# Patient Record
Sex: FEMALE | Race: WHITE | Employment: FULL TIME | URBAN - METROPOLITAN AREA
[De-identification: names, ages, dates, MRNs, and addresses within clinical notes are randomized per-mention and may not be internally consistent; named-entity substitution may affect disease eponyms.]

---

## 2017-02-11 PROBLEM — I26.99 PULMONARY EMBOLISM AND INFARCTION, IATROGENIC (HCC): Status: ACTIVE | Noted: 2017-02-11

## 2017-02-11 PROBLEM — T81.718A PULMONARY EMBOLISM AND INFARCTION, IATROGENIC (HCC): Status: ACTIVE | Noted: 2017-02-11

## 2018-02-28 ENCOUNTER — TELEPHONE (OUTPATIENT)
Dept: ENT CLINIC | Age: 33
End: 2018-02-28

## 2018-04-06 LAB
CHOLESTEROL, TOTAL: 263 MG/DL (ref 0–199)
FOLLICLE STIMULATING HORMONE: 6.2 MIU/ML
HDLC SERPL-MCNC: 35 MG/DL (ref 40–60)
LDL CHOLESTEROL CALCULATED: 186 MG/DL
TRIGL SERPL-MCNC: 212 MG/DL (ref 0–150)
TSH SERPL DL<=0.05 MIU/L-ACNC: 3.33 UIU/ML (ref 0.27–4.2)
VLDLC SERPL CALC-MCNC: 42 MG/DL

## 2018-04-07 LAB
ESTIMATED AVERAGE GLUCOSE: 128.4 MG/DL
HBA1C MFR BLD: 6.1 %

## 2018-07-08 NOTE — TELEPHONE ENCOUNTER
Apparently, this patient has no desire to be seen at this office. Pili call and inform her PCP that patient did not pursue the referral made to me for ENT E&M.

## 2018-10-25 ENCOUNTER — HOSPITAL ENCOUNTER (EMERGENCY)
Age: 33
Discharge: HOME | End: 2018-10-25
Payer: MEDICAID

## 2018-10-25 ENCOUNTER — APPOINTMENT (OUTPATIENT)
Dept: CT IMAGING | Age: 33
End: 2018-10-25
Payer: MEDICAID

## 2018-10-25 VITALS
WEIGHT: 200 LBS | TEMPERATURE: 98.3 F | HEART RATE: 95 BPM | SYSTOLIC BLOOD PRESSURE: 135 MMHG | RESPIRATION RATE: 16 BRPM | HEIGHT: 68 IN | OXYGEN SATURATION: 98 % | DIASTOLIC BLOOD PRESSURE: 98 MMHG | BODY MASS INDEX: 30.31 KG/M2

## 2018-10-25 DIAGNOSIS — M23.8X1 DERANGEMENT OF RIGHT KNEE LIGAMENT: Primary | ICD-10-CM

## 2018-10-25 PROCEDURE — 73700 CT LOWER EXTREMITY W/O DYE: CPT

## 2018-10-25 PROCEDURE — 99283 EMERGENCY DEPT VISIT LOW MDM: CPT

## 2018-10-25 PROCEDURE — 6370000000 HC RX 637 (ALT 250 FOR IP): Performed by: NURSE PRACTITIONER

## 2018-10-25 RX ORDER — NAPROXEN 250 MG/1
500 TABLET ORAL ONCE
Status: COMPLETED | OUTPATIENT
Start: 2018-10-25 | End: 2018-10-25

## 2018-10-25 RX ADMIN — NAPROXEN 500 MG: 250 TABLET ORAL at 15:28

## 2018-10-25 ASSESSMENT — ENCOUNTER SYMPTOMS
RHINORRHEA: 0
CONSTIPATION: 0
SHORTNESS OF BREATH: 0
ABDOMINAL PAIN: 0
BLOOD IN STOOL: 0
SORE THROAT: 0
NAUSEA: 0
VOMITING: 0
DIARRHEA: 0

## 2018-10-25 ASSESSMENT — PAIN SCALES - GENERAL
PAINLEVEL_OUTOF10: 10
PAINLEVEL_OUTOF10: 10

## 2018-10-25 ASSESSMENT — PAIN DESCRIPTION - ORIENTATION: ORIENTATION: RIGHT

## 2018-10-25 ASSESSMENT — PAIN DESCRIPTION - PAIN TYPE: TYPE: ACUTE PAIN

## 2018-10-25 ASSESSMENT — PAIN DESCRIPTION - LOCATION: LOCATION: KNEE

## 2018-10-25 NOTE — ED PROVIDER NOTES
note for interpretation of EKG. RADIOLOGY:   Interpretation per the Radiologist below, if available at the time of this note:    CT KNEE RIGHT WO CONTRAST   Preliminary Result   1. No acute fracture or gross dislocation identified by CT. 2. Mild edema in the subcutaneous fat along the anterior knee. 3. If pain persists, consider further evaluation with MRI. No results found. PROCEDURES   Unless otherwise noted below, none     Procedures    Right knee immoblizer splint was placed by the emergency department technician, it was applied appropriately and the patient was neurovascularly intact as observed by myself. This is definitive management. I have given crutches to the patient, adjusted them and provided complete instructions on safe use. CRITICAL CARE TIME     n/a    CONSULTS:  None      EMERGENCY DEPARTMENT COURSE and DIFFERENTIAL DIAGNOSIS/MDM:   Vitals:    Vitals:    10/25/18 1453   BP: (!) 135/98   Pulse: 101   Resp: 16   Temp: 98.3 °F (36.8 °C)   TempSrc: Oral   SpO2: 98%   Weight: 200 lb (90.7 kg)   Height: 5' 8\" (1.727 m)       Trisha Castellanos was given the following medications:  Medications   naproxen (NAPROSYN) tablet 500 mg (500 mg Oral Given 10/25/18 1528)       Trisha Castellanos was evaluated in the emergency department with concern for Right knee injury. CT imaging shows no evidence of acute fracture or dislocation. There is edema. She does have pain on the lateral aspect of the knee which is concerning for ligamental injury. My suspicion is low for fracture, dislocation, cellulitis, septic joint, joint effusion, arterial occlusion, vascular compromise, gout, or pseudogout. Differential include sprain, strain, occult fracture, or contusion. The patient was instructed on RICE therapy. Please continue knee immobilizer splint. Given crutches and instructed on their use. Orthopedic follow-up recommended.     Trisha Castellanos is stable in the ER and safe

## 2018-12-29 ENCOUNTER — HOSPITAL ENCOUNTER (OUTPATIENT)
Age: 33
Discharge: HOME | End: 2018-12-29
Payer: MEDICAID

## 2018-12-29 LAB — PROGESTERONE LEVEL: 3.87 NG/ML

## 2018-12-29 PROCEDURE — 36415 COLL VENOUS BLD VENIPUNCTURE: CPT

## 2018-12-29 PROCEDURE — 84144 ASSAY OF PROGESTERONE: CPT

## 2019-01-15 LAB
HPV COMMENT: NORMAL
HPV TYPE 16: NOT DETECTED
HPV TYPE 18: NOT DETECTED
HPVOH (OTHER TYPES): NOT DETECTED

## 2019-02-26 ENCOUNTER — HOSPITAL ENCOUNTER (OUTPATIENT)
Age: 34
Discharge: HOME | End: 2019-02-26
Payer: MEDICAID

## 2019-02-26 LAB — PROGESTERONE LEVEL: 6.07 NG/ML

## 2019-02-26 PROCEDURE — 36415 COLL VENOUS BLD VENIPUNCTURE: CPT

## 2019-02-26 PROCEDURE — 84144 ASSAY OF PROGESTERONE: CPT

## 2019-05-01 ENCOUNTER — HOSPITAL ENCOUNTER (OUTPATIENT)
Age: 34
Discharge: HOME | End: 2019-05-01
Payer: MEDICAID

## 2019-05-01 LAB — PROGESTERONE LEVEL: 0.13 NG/ML

## 2019-05-01 PROCEDURE — 84144 ASSAY OF PROGESTERONE: CPT

## 2019-05-01 PROCEDURE — 36415 COLL VENOUS BLD VENIPUNCTURE: CPT

## 2019-05-06 LAB — PROGESTERONE LEVEL: 0.79 NG/ML

## 2019-06-05 LAB
ABO/RH: NORMAL
ANTIBODY SCREEN: NORMAL
ANTIBODY SCREEN: NORMAL
HEPATITIS C ANTIBODY INTERPRETATION: NORMAL

## 2019-06-06 LAB
BASOPHILS ABSOLUTE: 0.1 K/UL (ref 0–0.2)
BASOPHILS RELATIVE PERCENT: 0.7 %
EOSINOPHILS ABSOLUTE: 0.1 K/UL (ref 0–0.6)
EOSINOPHILS RELATIVE PERCENT: 1.8 %
HCT VFR BLD CALC: 42.8 % (ref 36–48)
HEMOGLOBIN: 14 G/DL (ref 12–16)
HEPATITIS B SURFACE ANTIGEN INTERPRETATION: ABNORMAL
HIV AG/AB: NORMAL
HIV ANTIGEN: NORMAL
HIV-1 ANTIBODY: NORMAL
HIV-2 AB: NORMAL
LYMPHOCYTES ABSOLUTE: 2.3 K/UL (ref 1–5.1)
LYMPHOCYTES RELATIVE PERCENT: 28.5 %
MCH RBC QN AUTO: 31.2 PG (ref 26–34)
MCHC RBC AUTO-ENTMCNC: 32.8 G/DL (ref 31–36)
MCV RBC AUTO: 95 FL (ref 80–100)
MONOCYTES ABSOLUTE: 0.5 K/UL (ref 0–1.3)
MONOCYTES RELATIVE PERCENT: 6.4 %
NEUTROPHILS ABSOLUTE: 5.1 K/UL (ref 1.7–7.7)
NEUTROPHILS RELATIVE PERCENT: 62.6 %
PDW BLD-RTO: 15.7 % (ref 12.4–15.4)
PLATELET # BLD: 228 K/UL (ref 135–450)
PMV BLD AUTO: 9.5 FL (ref 5–10.5)
RBC # BLD: 4.5 M/UL (ref 4–5.2)
RUBELLA ANTIBODY IGG: >500 IU/ML
TOTAL SYPHILLIS IGG/IGM: ABNORMAL
URINE CULTURE, ROUTINE: NORMAL
WBC # BLD: 8.2 K/UL (ref 4–11)

## 2019-07-02 LAB — GLUCOSE CHALLENGE: 146 MG/DL

## 2019-08-02 ENCOUNTER — HOSPITAL ENCOUNTER (OUTPATIENT)
Dept: OTHER | Age: 34
Discharge: HOME | End: 2019-08-02
Payer: MEDICAID

## 2019-08-02 LAB
GLUCOSE FASTING: 111 MG/DL
GLUCOSE TOLERANCE TEST 1 HOUR: 197 MG/DL
GLUCOSE TOLERANCE TEST 2 HOUR: 126 MG/DL
GLUCOSE TOLERANCE TEST 3 HOUR: 91 MG/DL

## 2019-08-02 PROCEDURE — 82952 GTT-ADDED SAMPLES: CPT

## 2019-08-02 PROCEDURE — 36415 COLL VENOUS BLD VENIPUNCTURE: CPT

## 2019-08-02 PROCEDURE — 82951 GLUCOSE TOLERANCE TEST (GTT): CPT

## 2019-08-13 LAB
ESTIMATED AVERAGE GLUCOSE: 102.5 MG/DL
HBA1C MFR BLD: 5.2 %

## 2019-08-22 ENCOUNTER — HOSPITAL ENCOUNTER (OUTPATIENT)
Dept: DIABETES SERVICES | Age: 34
Setting detail: THERAPIES SERIES
Discharge: HOME | End: 2019-08-22
Payer: MEDICAID

## 2019-08-22 VITALS — BODY MASS INDEX: 33.6 KG/M2 | WEIGHT: 221 LBS

## 2019-08-22 DIAGNOSIS — O24.419 GESTATIONAL DIABETES MELLITUS (GDM), ANTEPARTUM, GESTATIONAL DIABETES METHOD OF CONTROL UNSPECIFIED: Primary | ICD-10-CM

## 2019-08-22 PROCEDURE — G0109 DIAB MANAGE TRN IND/GROUP: HCPCS | Performed by: DIETITIAN, REGISTERED

## 2019-08-22 ASSESSMENT — PATIENT HEALTH QUESTIONNAIRE - PHQ9
1. LITTLE INTEREST OR PLEASURE IN DOING THINGS: 0
2. FEELING DOWN, DEPRESSED OR HOPELESS: 1
SUM OF ALL RESPONSES TO PHQ QUESTIONS 1-9: 1
SUM OF ALL RESPONSES TO PHQ QUESTIONS 1-9: 1
SUM OF ALL RESPONSES TO PHQ9 QUESTIONS 1 & 2: 1

## 2019-08-22 NOTE — LETTER
Diabetes Education  [facility])    TO: Dr. Shanda Gracia    RE: Urmila Ziegler  : 1985    Your patient was seen for Gestational Diabetes Education on 2019. Patient is prescribed insulin twice daily. EDUCATION INCLUDED:   [x]  Definition of Gestational Diabetes                                                                                 [x]  Risk Factors/Acute Complications     [x]  Blood Glucose Monitoring        [x]  Exercise/Activity    [x]  Carbohydrate Counting  [x]  Timing of Meals    [x]  Food Logs  [x]  Eating Out  [x]  Nutrition Precautions for Pregnancy  [x]  Use of Blood glucose meter reviewed:pt already testing. Recent FB-125 mg/dl      Recent post meal B-190 mg/dl  [x]  PHQ2 Depression Screen; patient scored 1. Recommend further evaluation if       score >3    PATIENT GOALS:  [x]  Follow meal plan with appropriate Carbohydrate intake:   Breakfast: 15 - 30 g Carb    Lunch: 30 - 45 g Carb   Dinner:  45 - 60 g Carb    3 snacks: 15 - 30 g Carb each  [x] Monitor blood glucose four times daily. FBG Goal: 60 - 95 or as recommended by physician   1hr PP <140 or as recommended by physician   2 hr PP <120 or as recommended by physician  [x] Exercise    [x] Log glucose results and bring to physicians office at each scheduled appointment  [x] Other:     PLAN:  [x] Return for follow-up visit to review Food/Log      Thank you for the opportunity to provide Diabetes Self Management Education to your patient.     Biipn Barr  Diabetes Educator

## 2019-08-22 NOTE — PROGRESS NOTES
Gestational Diabetes Patient Assessment and Education    Name: Mauri Hernandez : 1985  EDC:20      Education Completed  [x]  Definition of Gestational Diabetes                                                                                 [x]  Risk Factors/Acute Complications     [x]  Blood Glucose Monitoring--schedule, target levels     [x]  Exercise/Activity    [x]  Carbohydrate Counting  [x]  Timing of Meals    [x]  Food Logs  [x]  Eating Out  [x]  Nutrition Precautions for Pregnancy  [x]  Use of Blood glucose meter reviewed- already testing BG            Patient Goals  [x]  Follow meal plan with appropriate Carbohydrate intake:   Breakfast: 30 g Carb    Lunch: 30 - 45 g Carb   Dinner:  45 - 60 g Carb    3 snacks: 15 - 30 g Carb each  [x] Monitor blood glucose four times daily.     FBG Goal: 60 - 95 or as recommended by physician   1hr PP <140 or as recommended by physician   2 hr PP <120 or as recommended by physician  [x] Exercise    [x] Log glucose results and bring to physicians office at each scheduled appointment  [] Other:     Plan  [x] Return for follow-up visit to review Food/Log      Referring Provider: Lata    Total participants in Group:3

## 2019-08-28 ENCOUNTER — HOSPITAL ENCOUNTER (OUTPATIENT)
Dept: DIABETES SERVICES | Age: 34
Setting detail: THERAPIES SERIES
Discharge: HOME | End: 2019-08-28
Payer: MEDICAID

## 2019-08-28 DIAGNOSIS — O24.419 GESTATIONAL DIABETES MELLITUS (GDM), ANTEPARTUM, GESTATIONAL DIABETES METHOD OF CONTROL UNSPECIFIED: Primary | ICD-10-CM

## 2019-08-28 PROCEDURE — G0108 DIAB MANAGE TRN  PER INDIV: HCPCS | Performed by: DIETITIAN, REGISTERED

## 2019-08-28 NOTE — LETTER
Diabetes Education  [facility])    TO: Dr. Nel Monroe    RE: Dennie Cash  : 1985    Your patient attended a Follow-up session for Gestational Diabetes on 2019. Education included the following:   [x] Review of BG Log   Fasting BG Range: mg/dl- pt will start on Metformin this evening. 1 hr PP BG Range:  mg/dl      In past week since diabetes class post prandial <140 mg/dl                                     [x] Review of Food Log   [x] Additional Resources- insulin site rotation, take Metformin with food. [x]  Diet guidelines for sick days  [x] Post-Partum Guidelines  [x] Other:Insulin regimen: Humulin-N 10 units at breakfast and 20 units at bedtime. Post Program Recommendations include:  [x] Diabetes Screening 6 - 12 weeks post partum  [] Pre-Diabetes Group Class   [] Other : Thank you for the opportunity to provide Diabetes Education to your patient.     [address] Diabetes Educator

## 2019-09-07 ENCOUNTER — HOSPITAL ENCOUNTER (EMERGENCY)
Age: 34
Discharge: HOME | End: 2019-09-08
Attending: EMERGENCY MEDICINE
Payer: MEDICAID

## 2019-09-07 VITALS
HEART RATE: 97 BPM | BODY MASS INDEX: 33.49 KG/M2 | WEIGHT: 221 LBS | OXYGEN SATURATION: 100 % | HEIGHT: 68 IN | DIASTOLIC BLOOD PRESSURE: 85 MMHG | SYSTOLIC BLOOD PRESSURE: 135 MMHG | RESPIRATION RATE: 16 BRPM | TEMPERATURE: 99.2 F

## 2019-09-07 DIAGNOSIS — Z3A.19 19 WEEKS GESTATION OF PREGNANCY: ICD-10-CM

## 2019-09-07 DIAGNOSIS — R10.9 RIGHT FLANK PAIN: Primary | ICD-10-CM

## 2019-09-07 PROCEDURE — 81003 URINALYSIS AUTO W/O SCOPE: CPT

## 2019-09-07 PROCEDURE — 99284 EMERGENCY DEPT VISIT MOD MDM: CPT

## 2019-09-07 ASSESSMENT — PAIN SCALES - GENERAL: PAINLEVEL_OUTOF10: 9

## 2019-09-08 LAB
BILIRUBIN URINE: NEGATIVE
BLOOD, URINE: NEGATIVE
CLARITY: CLEAR
COLOR: YELLOW
GLUCOSE URINE: NEGATIVE MG/DL
KETONES, URINE: NEGATIVE MG/DL
LEUKOCYTE ESTERASE, URINE: NEGATIVE
MICROSCOPIC EXAMINATION: NORMAL
NITRITE, URINE: NEGATIVE
PH UA: 6 (ref 5–8)
PROTEIN UA: NEGATIVE MG/DL
SPECIFIC GRAVITY UA: 1.01 (ref 1–1.03)
URINE REFLEX TO CULTURE: NORMAL
URINE TYPE: NORMAL
UROBILINOGEN, URINE: 0.2 E.U./DL

## 2019-09-08 ASSESSMENT — ENCOUNTER SYMPTOMS
SHORTNESS OF BREATH: 0
NAUSEA: 0
ABDOMINAL PAIN: 1
VOMITING: 0

## 2019-09-08 NOTE — CONSULTS
Department of Obstetrics and Gynecology  Consult        CHIEF COMPLAINT:  abdominal pain    HISTORY OF PRESENT ILLNESS:      The patient is a 36 y.o. female at 23w7d  OB History        2    Para   1    Term   1       0    AB   0    Living   1       SAB   0    TAB   0    Ectopic   0    Molar        Multiple   0    Live Births   1            Pt developed RLQ pain and midabdominal pain several days ago- intermittent. has some nausea, no emesis. Pt had constipation for 2 weeks but it resolved after she start taking Metformin for gestational diabetes.     PRENATAL CARE:    Complicated by: gestational diabetes class A 2, Hx of PE- currently on Lovenox    PAST OB HISTORY:  OB History        2    Para   1    Term   1       0    AB   0    Living   1       SAB   0    TAB   0    Ectopic   0    Molar        Multiple   0    Live Births   1                Past Medical History:        Diagnosis Date    Heart murmur     High blood pressure     during pregnancy    Infertility, female 1    conception on Clomid     Migraine     Pulmonary embolism on right [facility])      Past Surgical History:        Procedure Laterality Date     SECTION  2015    CHOLECYSTECTOMY  2016    KNEE SURGERY Right     to treat bone spur    KNEE SURGERY  2011    TONSILLECTOMY      WISDOM TOOTH EXTRACTION       Allergies:  Ciprofloxacin; Azithromycin; Cipro xr; and Sulfa antibiotics    Social History:    Social History     Socioeconomic History    Marital status: Single     Spouse name: Not on file    Number of children: Not on file    Years of education: Not on file    Highest education level: Not on file   Occupational History    Not on file   Social Needs    Financial resource strain: Not on file    Food insecurity:     Worry: Not on file     Inability: Not on file    Transportation needs:     Medical: Not on file     Non-medical: Not on file   Tobacco Use    Smoking status: Former Smoker

## 2019-09-08 NOTE — ED PROVIDER NOTES
tablet, R-0Print               ALLERGIES     Ciprofloxacin; Azithromycin; Cipro xr; and Sulfa antibiotics    FAMILYHISTORY       Family History   Problem Relation Age of Onset    High Blood Pressure Mother     High Cholesterol Mother     High Blood Pressure Father     Asthma Father     High Cholesterol Father    [employer] Early Death Sister         accidental overdose     Diabetes Maternal Grandmother     Arthritis Maternal Grandmother     Stroke Maternal Grandmother     Birth Defects Neg Hx     Cancer Neg Hx     Depression Neg Hx     Kidney Disease Neg Hx     Heart Disease Neg Hx     Hearing Loss Neg Hx     Learning Disabilities Neg Hx     Mental Illness Neg Hx     Mental Retardation Neg Hx     Miscarriages / Stillbirths Neg Hx     Substance Abuse Neg Hx     Vision Loss Neg Hx           SOCIAL HISTORY       Social History     Socioeconomic History    Marital status: Single     Spouse name: None    Number of children: None    Years of education: None    Highest education level: None   Occupational History    None   Social Needs    Financial resource strain: None    Food insecurity:     Worry: None     Inability: None    Transportation needs:     Medical: None     Non-medical: None   Tobacco Use    Smoking status: Former Smoker     Packs/day: 0.50     Types: Cigarettes     Last attempt to quit: 3/24/2015     Years since quittin.4    Smokeless tobacco: Never Used   Substance and Sexual Activity    Alcohol use: Yes     Comment: occ    Drug use: No    Sexual activity: Yes     Partners: Male   Lifestyle    Physical activity:     Days per week: None     Minutes per session: None    Stress: None   Relationships    Social connections:     Talks on phone: None     Gets together: None     Attends Rastafarian service: None     Active member of club or organization: None     Attends meetings of clubs or organizations: None     Relationship status: None    Intimate partner violence:     Fear of

## 2019-10-10 LAB
BASOPHILS ABSOLUTE: 0 K/UL (ref 0–0.2)
BASOPHILS RELATIVE PERCENT: 0.3 %
EOSINOPHILS ABSOLUTE: 0.2 K/UL (ref 0–0.6)
EOSINOPHILS RELATIVE PERCENT: 1.7 %
HCT VFR BLD CALC: 35.7 % (ref 36–48)
HEMOGLOBIN: 12.4 G/DL (ref 12–16)
LYMPHOCYTES ABSOLUTE: 2.1 K/UL (ref 1–5.1)
LYMPHOCYTES RELATIVE PERCENT: 22.8 %
MCH RBC QN AUTO: 30.8 PG (ref 26–34)
MCHC RBC AUTO-ENTMCNC: 34.6 G/DL (ref 31–36)
MCV RBC AUTO: 88.9 FL (ref 80–100)
MONOCYTES ABSOLUTE: 0.5 K/UL (ref 0–1.3)
MONOCYTES RELATIVE PERCENT: 5.4 %
NEUTROPHILS ABSOLUTE: 6.3 K/UL (ref 1.7–7.7)
NEUTROPHILS RELATIVE PERCENT: 69.8 %
PDW BLD-RTO: 14.6 % (ref 12.4–15.4)
PLATELET # BLD: 253 K/UL (ref 135–450)
PMV BLD AUTO: 9.6 FL (ref 5–10.5)
RBC # BLD: 4.02 M/UL (ref 4–5.2)
WBC # BLD: 9 K/UL (ref 4–11)

## 2019-11-06 ENCOUNTER — HOSPITAL ENCOUNTER (OUTPATIENT)
Age: 34
Discharge: HOME | End: 2019-11-06
Attending: OBSTETRICS & GYNECOLOGY | Admitting: OBSTETRICS & GYNECOLOGY
Payer: MEDICAID

## 2019-11-06 VITALS
TEMPERATURE: 96.4 F | DIASTOLIC BLOOD PRESSURE: 85 MMHG | RESPIRATION RATE: 20 BRPM | HEART RATE: 115 BPM | SYSTOLIC BLOOD PRESSURE: 140 MMHG

## 2019-11-06 LAB — RHIG LOT NUMBER: NORMAL

## 2019-11-06 PROCEDURE — 96372 THER/PROPH/DIAG INJ SC/IM: CPT

## 2019-11-06 PROCEDURE — 6360000002 HC RX W HCPCS: Performed by: OBSTETRICS & GYNECOLOGY

## 2019-11-06 PROCEDURE — 90715 TDAP VACCINE 7 YRS/> IM: CPT | Performed by: OBSTETRICS & GYNECOLOGY

## 2019-11-06 PROCEDURE — 90471 IMMUNIZATION ADMIN: CPT | Performed by: OBSTETRICS & GYNECOLOGY

## 2019-11-06 RX ADMIN — TETANUS TOXOID, REDUCED DIPHTHERIA TOXOID AND ACELLULAR PERTUSSIS VACCINE, ADSORBED 0.5 ML: 5; 2.5; 8; 8; 2.5 SUSPENSION INTRAMUSCULAR at 15:28

## 2019-11-06 RX ADMIN — HUMAN RHO(D) IMMUNE GLOBULIN 300 MCG: 300 INJECTION, SOLUTION INTRAMUSCULAR at 15:27

## 2019-11-29 ENCOUNTER — HOSPITAL ENCOUNTER (OUTPATIENT)
Age: 34
Discharge: HOME | End: 2019-11-29
Attending: OBSTETRICS & GYNECOLOGY | Admitting: OBSTETRICS & GYNECOLOGY
Payer: MEDICAID

## 2019-11-29 VITALS
BODY MASS INDEX: 36.07 KG/M2 | WEIGHT: 238 LBS | HEIGHT: 68 IN | SYSTOLIC BLOOD PRESSURE: 139 MMHG | RESPIRATION RATE: 18 BRPM | HEART RATE: 87 BPM | TEMPERATURE: 97 F | DIASTOLIC BLOOD PRESSURE: 87 MMHG

## 2019-11-29 PROBLEM — R42 DIZZINESS: Status: ACTIVE | Noted: 2019-11-29

## 2019-11-29 LAB
BILIRUBIN URINE: NEGATIVE
BLOOD, URINE: NEGATIVE
CLARITY: CLEAR
COLOR: YELLOW
GLUCOSE BLD-MCNC: 103 MG/DL (ref 70–99)
GLUCOSE URINE: NEGATIVE MG/DL
HCT VFR BLD CALC: 34.2 % (ref 36–48)
HEMOGLOBIN: 11.8 G/DL (ref 12–16)
KETONES, URINE: NEGATIVE MG/DL
LEUKOCYTE ESTERASE, URINE: NEGATIVE
MCH RBC QN AUTO: 29.7 PG (ref 26–34)
MCHC RBC AUTO-ENTMCNC: 34.4 G/DL (ref 31–36)
MCV RBC AUTO: 86.3 FL (ref 80–100)
MICROSCOPIC EXAMINATION: NORMAL
NITRITE, URINE: NEGATIVE
PDW BLD-RTO: 14.7 % (ref 12.4–15.4)
PERFORMED ON: ABNORMAL
PH UA: 6 (ref 5–8)
PLATELET # BLD: 278 K/UL (ref 135–450)
PMV BLD AUTO: 8.6 FL (ref 5–10.5)
PROTEIN UA: NEGATIVE MG/DL
RBC # BLD: 3.96 M/UL (ref 4–5.2)
SPECIFIC GRAVITY UA: 1.01 (ref 1–1.03)
URINE TYPE: NORMAL
UROBILINOGEN, URINE: 1 E.U./DL
WBC # BLD: 7.7 K/UL (ref 4–11)

## 2019-11-29 PROCEDURE — 99211 OFF/OP EST MAY X REQ PHY/QHP: CPT

## 2019-11-29 PROCEDURE — 59025 FETAL NON-STRESS TEST: CPT

## 2019-11-29 PROCEDURE — 81003 URINALYSIS AUTO W/O SCOPE: CPT

## 2019-11-29 PROCEDURE — 85027 COMPLETE CBC AUTOMATED: CPT

## 2019-12-01 LAB — GBS, EXTERNAL RESULT: NEGATIVE

## 2019-12-19 ENCOUNTER — HOSPITAL ENCOUNTER (OUTPATIENT)
Age: 34
Discharge: HOME | End: 2019-12-19
Attending: OBSTETRICS & GYNECOLOGY | Admitting: OBSTETRICS & GYNECOLOGY
Payer: MEDICAID

## 2019-12-19 VITALS
TEMPERATURE: 98.4 F | HEART RATE: 85 BPM | SYSTOLIC BLOOD PRESSURE: 109 MMHG | DIASTOLIC BLOOD PRESSURE: 70 MMHG | RESPIRATION RATE: 18 BRPM

## 2019-12-19 PROBLEM — R51.9 HEADACHE IN PREGNANCY, ANTEPARTUM: Status: ACTIVE | Noted: 2019-12-19

## 2019-12-19 PROBLEM — O26.899 HEADACHE IN PREGNANCY, ANTEPARTUM: Status: ACTIVE | Noted: 2019-12-19

## 2019-12-19 LAB
A/G RATIO: 0.8 (ref 1.1–2.2)
ALBUMIN SERPL-MCNC: 3.1 G/DL (ref 3.4–5)
ALP BLD-CCNC: 140 U/L (ref 40–129)
ALT SERPL-CCNC: 11 U/L (ref 10–40)
ANION GAP SERPL CALCULATED.3IONS-SCNC: 13 MMOL/L (ref 3–16)
AST SERPL-CCNC: 9 U/L (ref 15–37)
BASOPHILS ABSOLUTE: 0.1 K/UL (ref 0–0.2)
BASOPHILS RELATIVE PERCENT: 1 %
BILIRUB SERPL-MCNC: <0.2 MG/DL (ref 0–1)
BILIRUBIN URINE: NEGATIVE
BLOOD, URINE: NEGATIVE
BUN BLDV-MCNC: 9 MG/DL (ref 7–20)
CALCIUM SERPL-MCNC: 8.9 MG/DL (ref 8.3–10.6)
CHLORIDE BLD-SCNC: 105 MMOL/L (ref 99–110)
CLARITY: ABNORMAL
CO2: 19 MMOL/L (ref 21–32)
COLOR: YELLOW
CREAT SERPL-MCNC: 0.5 MG/DL (ref 0.6–1.1)
EOSINOPHILS ABSOLUTE: 0.1 K/UL (ref 0–0.6)
EOSINOPHILS RELATIVE PERCENT: 1 %
EPITHELIAL CELLS, UA: 18 /HPF (ref 0–5)
ESTIMATED AVERAGE GLUCOSE: 99.7 MG/DL
GFR AFRICAN AMERICAN: >60
GFR NON-AFRICAN AMERICAN: >60
GLOBULIN: 4 G/DL
GLUCOSE BLD-MCNC: 96 MG/DL (ref 70–99)
GLUCOSE URINE: NEGATIVE MG/DL
HBA1C MFR BLD: 5.1 %
HCT VFR BLD CALC: 36 % (ref 36–48)
HEMOGLOBIN: 12.6 G/DL (ref 12–16)
HYALINE CASTS: 8 /LPF (ref 0–8)
KETONES, URINE: NEGATIVE MG/DL
LEUKOCYTE ESTERASE, URINE: ABNORMAL
LYMPHOCYTES ABSOLUTE: 2 K/UL (ref 1–5.1)
LYMPHOCYTES RELATIVE PERCENT: 23.9 %
MCH RBC QN AUTO: 29.8 PG (ref 26–34)
MCHC RBC AUTO-ENTMCNC: 34.9 G/DL (ref 31–36)
MCV RBC AUTO: 85.3 FL (ref 80–100)
MICROSCOPIC EXAMINATION: YES
MONOCYTES ABSOLUTE: 0.4 K/UL (ref 0–1.3)
MONOCYTES RELATIVE PERCENT: 4.9 %
NEUTROPHILS ABSOLUTE: 5.8 K/UL (ref 1.7–7.7)
NEUTROPHILS RELATIVE PERCENT: 69.2 %
NITRITE, URINE: NEGATIVE
PDW BLD-RTO: 14.8 % (ref 12.4–15.4)
PH UA: 6.5 (ref 5–8)
PLATELET # BLD: 250 K/UL (ref 135–450)
PMV BLD AUTO: 9 FL (ref 5–10.5)
POTASSIUM SERPL-SCNC: 4 MMOL/L (ref 3.5–5.1)
PROTEIN UA: NEGATIVE MG/DL
RBC # BLD: 4.22 M/UL (ref 4–5.2)
RBC UA: 3 /HPF (ref 0–4)
SODIUM BLD-SCNC: 137 MMOL/L (ref 136–145)
SPECIFIC GRAVITY UA: 1.02 (ref 1–1.03)
TOTAL PROTEIN: 7.1 G/DL (ref 6.4–8.2)
URIC ACID, SERUM: 5.4 MG/DL (ref 2.6–6)
URINE TYPE: ABNORMAL
UROBILINOGEN, URINE: 0.2 E.U./DL
WBC # BLD: 8.3 K/UL (ref 4–11)
WBC UA: 8 /HPF (ref 0–5)

## 2019-12-19 PROCEDURE — 81001 URINALYSIS AUTO W/SCOPE: CPT

## 2019-12-19 PROCEDURE — 59025 FETAL NON-STRESS TEST: CPT

## 2019-12-19 PROCEDURE — 84550 ASSAY OF BLOOD/URIC ACID: CPT

## 2019-12-19 PROCEDURE — 85025 COMPLETE CBC W/AUTO DIFF WBC: CPT

## 2019-12-19 PROCEDURE — 80053 COMPREHEN METABOLIC PANEL: CPT

## 2019-12-19 PROCEDURE — 99211 OFF/OP EST MAY X REQ PHY/QHP: CPT

## 2019-12-19 RX ORDER — SYRING-NEEDL,DISP,INSUL,0.3 ML 31GX15/64"
1 SYRINGE, EMPTY DISPOSABLE MISCELLANEOUS 4 TIMES DAILY
Refills: 11 | Status: ON HOLD | COMMUNITY
Start: 2019-11-09 | End: 2020-01-09

## 2019-12-19 RX ORDER — INSULIN HUMAN 100 [IU]/ML
18 INJECTION, SOLUTION PARENTERAL
Status: ON HOLD | COMMUNITY
Start: 2019-12-18 | End: 2020-01-09

## 2019-12-19 RX ORDER — ALBUTEROL SULFATE 90 UG/1
2 AEROSOL, METERED RESPIRATORY (INHALATION) 4 TIMES DAILY
COMMUNITY
Start: 2019-12-14

## 2019-12-19 RX ORDER — ACARBOSE 25 MG/1
25 TABLET ORAL DAILY
Status: ON HOLD | COMMUNITY
Start: 2019-12-18 | End: 2020-01-09

## 2019-12-19 RX ORDER — FLUTICASONE PROPIONATE 50 MCG
1 SPRAY, SUSPENSION (ML) NASAL DAILY
Status: ON HOLD | COMMUNITY
Start: 2019-12-14 | End: 2020-01-09 | Stop reason: ALTCHOICE

## 2019-12-19 RX ORDER — AMOXICILLIN 875 MG/1
875 TABLET, COATED ORAL 2 TIMES DAILY
Status: ON HOLD | COMMUNITY
Start: 2019-12-14 | End: 2020-01-09 | Stop reason: ALTCHOICE

## 2019-12-19 ASSESSMENT — PAIN DESCRIPTION - DESCRIPTORS: DESCRIPTORS: THROBBING

## 2019-12-23 ENCOUNTER — HOSPITAL ENCOUNTER (OUTPATIENT)
Age: 34
Discharge: HOME | End: 2019-12-23
Attending: OBSTETRICS & GYNECOLOGY | Admitting: OBSTETRICS & GYNECOLOGY
Payer: MEDICAID

## 2019-12-23 VITALS
TEMPERATURE: 99 F | DIASTOLIC BLOOD PRESSURE: 84 MMHG | SYSTOLIC BLOOD PRESSURE: 123 MMHG | HEART RATE: 70 BPM | RESPIRATION RATE: 20 BRPM

## 2019-12-23 PROBLEM — Z36.89 NST (NON-STRESS TEST) REACTIVE: Status: ACTIVE | Noted: 2019-12-23

## 2019-12-23 PROCEDURE — 59025 FETAL NON-STRESS TEST: CPT

## 2019-12-28 ENCOUNTER — HOSPITAL ENCOUNTER (OUTPATIENT)
Age: 34
Discharge: HOME | End: 2019-12-28
Attending: OBSTETRICS & GYNECOLOGY | Admitting: OBSTETRICS & GYNECOLOGY
Payer: MEDICAID

## 2019-12-28 VITALS — HEART RATE: 81 BPM | RESPIRATION RATE: 18 BRPM | SYSTOLIC BLOOD PRESSURE: 118 MMHG | DIASTOLIC BLOOD PRESSURE: 77 MMHG

## 2019-12-28 PROBLEM — O24.419 GESTATIONAL DIABETES: Status: ACTIVE | Noted: 2019-12-28

## 2019-12-28 PROCEDURE — 59025 FETAL NON-STRESS TEST: CPT

## 2019-12-30 ENCOUNTER — HOSPITAL ENCOUNTER (OUTPATIENT)
Age: 34
Discharge: HOME | End: 2019-12-30
Attending: OBSTETRICS & GYNECOLOGY | Admitting: OBSTETRICS & GYNECOLOGY
Payer: MEDICAID

## 2019-12-30 VITALS
TEMPERATURE: 97 F | SYSTOLIC BLOOD PRESSURE: 147 MMHG | DIASTOLIC BLOOD PRESSURE: 94 MMHG | HEART RATE: 93 BPM | RESPIRATION RATE: 18 BRPM

## 2019-12-30 VITALS
WEIGHT: 249 LBS | TEMPERATURE: 97.5 F | RESPIRATION RATE: 18 BRPM | HEART RATE: 82 BPM | DIASTOLIC BLOOD PRESSURE: 62 MMHG | SYSTOLIC BLOOD PRESSURE: 116 MMHG | BODY MASS INDEX: 37.86 KG/M2

## 2019-12-30 LAB
A/G RATIO: 0.9 (ref 1.1–2.2)
ALBUMIN SERPL-MCNC: 3.1 G/DL (ref 3.4–5)
ALP BLD-CCNC: 135 U/L (ref 40–129)
ALT SERPL-CCNC: 7 U/L (ref 10–40)
ANION GAP SERPL CALCULATED.3IONS-SCNC: 12 MMOL/L (ref 3–16)
APTT: 27.8 SEC (ref 24.2–36.2)
AST SERPL-CCNC: 8 U/L (ref 15–37)
BASOPHILS ABSOLUTE: 0 K/UL (ref 0–0.2)
BASOPHILS RELATIVE PERCENT: 0.5 %
BILIRUB SERPL-MCNC: <0.2 MG/DL (ref 0–1)
BILIRUBIN URINE: NEGATIVE
BLOOD, URINE: NEGATIVE
BUN BLDV-MCNC: 8 MG/DL (ref 7–20)
CALCIUM SERPL-MCNC: 9.1 MG/DL (ref 8.3–10.6)
CHLORIDE BLD-SCNC: 101 MMOL/L (ref 99–110)
CLARITY: ABNORMAL
CO2: 21 MMOL/L (ref 21–32)
COLOR: YELLOW
CREAT SERPL-MCNC: 0.8 MG/DL (ref 0.6–1.1)
EOSINOPHILS ABSOLUTE: 0.1 K/UL (ref 0–0.6)
EOSINOPHILS RELATIVE PERCENT: 0.7 %
EPITHELIAL CELLS, UA: 14 /HPF (ref 0–5)
FIBRINOGEN: 533 MG/DL (ref 200–397)
GFR AFRICAN AMERICAN: >60
GFR NON-AFRICAN AMERICAN: >60
GLOBULIN: 3.6 G/DL
GLUCOSE BLD-MCNC: 81 MG/DL (ref 70–99)
GLUCOSE URINE: NEGATIVE MG/DL
HCT VFR BLD CALC: 36.8 % (ref 36–48)
HEMOGLOBIN: 12.7 G/DL (ref 12–16)
HYALINE CASTS: 4 /LPF (ref 0–8)
INR BLD: 0.81 (ref 0.86–1.14)
KETONES, URINE: NEGATIVE MG/DL
LEUKOCYTE ESTERASE, URINE: ABNORMAL
LYMPHOCYTES ABSOLUTE: 2.3 K/UL (ref 1–5.1)
LYMPHOCYTES RELATIVE PERCENT: 25.5 %
MCH RBC QN AUTO: 29.6 PG (ref 26–34)
MCHC RBC AUTO-ENTMCNC: 34.5 G/DL (ref 31–36)
MCV RBC AUTO: 85.9 FL (ref 80–100)
MICROSCOPIC EXAMINATION: YES
MONOCYTES ABSOLUTE: 0.5 K/UL (ref 0–1.3)
MONOCYTES RELATIVE PERCENT: 5.4 %
NEUTROPHILS ABSOLUTE: 6.1 K/UL (ref 1.7–7.7)
NEUTROPHILS RELATIVE PERCENT: 67.9 %
NITRITE, URINE: NEGATIVE
PDW BLD-RTO: 15.2 % (ref 12.4–15.4)
PH UA: 6 (ref 5–8)
PLATELET # BLD: 240 K/UL (ref 135–450)
PMV BLD AUTO: 9.9 FL (ref 5–10.5)
POTASSIUM SERPL-SCNC: 4.1 MMOL/L (ref 3.5–5.1)
PROTEIN UA: NEGATIVE MG/DL
PROTHROMBIN TIME: 9.4 SEC (ref 10–13.2)
RBC # BLD: 4.29 M/UL (ref 4–5.2)
RBC UA: 3 /HPF (ref 0–4)
SODIUM BLD-SCNC: 134 MMOL/L (ref 136–145)
SPECIFIC GRAVITY UA: 1.02 (ref 1–1.03)
TOTAL PROTEIN: 6.7 G/DL (ref 6.4–8.2)
URIC ACID, SERUM: 6 MG/DL (ref 2.6–6)
URINE TYPE: ABNORMAL
UROBILINOGEN, URINE: 0.2 E.U./DL
WBC # BLD: 9 K/UL (ref 4–11)
WBC UA: 3 /HPF (ref 0–5)

## 2019-12-30 PROCEDURE — 85730 THROMBOPLASTIN TIME PARTIAL: CPT

## 2019-12-30 PROCEDURE — 81001 URINALYSIS AUTO W/SCOPE: CPT

## 2019-12-30 PROCEDURE — 99211 OFF/OP EST MAY X REQ PHY/QHP: CPT

## 2019-12-30 PROCEDURE — 6370000000 HC RX 637 (ALT 250 FOR IP): Performed by: OBSTETRICS & GYNECOLOGY

## 2019-12-30 PROCEDURE — 80053 COMPREHEN METABOLIC PANEL: CPT

## 2019-12-30 PROCEDURE — 85025 COMPLETE CBC W/AUTO DIFF WBC: CPT

## 2019-12-30 PROCEDURE — 84550 ASSAY OF BLOOD/URIC ACID: CPT

## 2019-12-30 PROCEDURE — 59025 FETAL NON-STRESS TEST: CPT

## 2019-12-30 PROCEDURE — 85384 FIBRINOGEN ACTIVITY: CPT

## 2019-12-30 PROCEDURE — 85610 PROTHROMBIN TIME: CPT

## 2019-12-30 RX ORDER — OXYCODONE HYDROCHLORIDE 5 MG/1
5 TABLET ORAL ONCE
Status: DISCONTINUED | OUTPATIENT
Start: 2019-12-30 | End: 2019-12-30

## 2019-12-30 RX ORDER — ZOLPIDEM TARTRATE 5 MG/1
5 TABLET ORAL ONCE
Status: COMPLETED | OUTPATIENT
Start: 2019-12-30 | End: 2019-12-30

## 2019-12-30 RX ADMIN — ZOLPIDEM TARTRATE 5 MG: 5 TABLET ORAL at 19:17

## 2019-12-30 ASSESSMENT — PAIN DESCRIPTION - DESCRIPTORS
DESCRIPTORS: ACHING
DESCRIPTORS: ACHING

## 2019-12-30 NOTE — DISCHARGE SUMMARY
Department of Obstetrics and Gynecology  Labor and Delivery Triage Note        CHIEF COMPLAINT:  elevated blood pressure, headache    HISTORY OF PRESENT ILLNESS:      The patient is a 36 y.o. female 36w0d. OB History        2    Para   1    Term   1       0    AB   0    Living   1       SAB   0    TAB   0    Ectopic   0    Molar        Multiple   1    Live Births   1              Patient presents with a chief complaint as above. Estimated Due Date:  Estimated Date of Delivery: 20    PRENATAL CARE:    Complicated by: gestational hypertension and gestational diabetes class A 2    REVIEW OF SYSTEMS:    CONSTITUTIONAL:  negative  EYES:  negative  HEENT:  negative  RESPIRATORY:  negative  CARDIOVASCULAR:  negative  GASTROINTESTINAL:  negative  GENITOURINARY:  negative  INTEGUMENT/BREAST:  negative  MUSCULOSKELETAL:  negative  BEHAVIOR/PSYCH:  negative    PHYSICAL EXAM:    Vitals:    19 1230 19 1245 19 1259 19 1315   BP: 124/79 108/62 (!) 98/58 116/62   Pulse: 91 88 90 82   Resp: 18  18 (P) 18   Temp:       TempSrc:       Weight:           Physical Examination: General appearance - alert, well appearing, and in no distress  FHT reactive. Contraction frequency:  0 minutes    Membranes:  Intact    Lab Results   Component Value Date    WBC 9.0 2019    HGB 12.7 2019    HCT 36.8 2019     2019    CHOL 263 (H) 2018    TRIG 212 (H) 2018    HDL 35 (L) 2018    ALT 7 (L) 2019    AST 8 (L) 2019     (L) 2019    K 4.1 2019     2019    CREATININE 0.8 2019    BUN 8 2019    CO2 21 2019    TSH 3.33 2018    INR 0.81 (L) 2019    GLUF 111 (H) 2019    LABA1C 5.1 2019    LABMICR YES 2019       ASSESSMENT AND PLAN:  Discharge Dx: The patient is a 36 y.o. female 36w0d.     OB History        2    Para   1    Term   1       0

## 2019-12-31 ENCOUNTER — HOSPITAL ENCOUNTER (OUTPATIENT)
Age: 34
Setting detail: SPECIMEN
Discharge: HOME | End: 2019-12-31
Payer: MEDICAID

## 2019-12-31 PROCEDURE — 84156 ASSAY OF PROTEIN URINE: CPT

## 2019-12-31 NOTE — FLOWSHEET NOTE
Dr. Chantal Braun in to evaluate and talk with pt extensively about plan of care. Pt. Declines narcotic oxycodone. ambien ordered for pt. Pt. To be discharged to home with instructions for 24 hour urine collection. Pt. Blood pressures viewed per Dr. Chantal Braun. Pt. To take aspirin free excederin for headache and to keep all scheduled appointments.

## 2020-01-01 LAB
24HR URINE VOLUME (ML): 900 ML
CREATININE 24 HOUR URINE: 1.7 G/24HR (ref 0.6–1.5)
Lab: 24 HOURS
PROTEIN 24 HOUR URINE: 0.19 G/24HR

## 2020-01-02 ENCOUNTER — HOSPITAL ENCOUNTER (OUTPATIENT)
Age: 35
Setting detail: OBSERVATION
LOS: 1 days | Discharge: HOME | End: 2020-01-04
Attending: OBSTETRICS & GYNECOLOGY | Admitting: OBSTETRICS & GYNECOLOGY
Payer: MEDICAID

## 2020-01-02 ENCOUNTER — ANESTHESIA (OUTPATIENT)
Dept: LABOR AND DELIVERY | Age: 35
End: 2020-01-02

## 2020-01-02 ENCOUNTER — ANESTHESIA EVENT (OUTPATIENT)
Dept: LABOR AND DELIVERY | Age: 35
End: 2020-01-02

## 2020-01-02 PROBLEM — O26.90 PREGNANCY COMPLICATION, ANTEPARTUM: Status: ACTIVE | Noted: 2020-01-02

## 2020-01-02 PROBLEM — O36.8190 DECREASED FETAL MOVEMENT AFFECTING MANAGEMENT OF MOTHER, ANTEPARTUM: Status: ACTIVE | Noted: 2020-01-02

## 2020-01-02 LAB
ABO/RH: NORMAL
ALBUMIN SERPL-MCNC: 3.5 G/DL (ref 3.4–5)
ALP BLD-CCNC: 146 U/L (ref 40–129)
ALT SERPL-CCNC: <5 U/L (ref 10–40)
AMPHETAMINE SCREEN, URINE: NORMAL
ANTIBODY SCREEN: NORMAL
ANTIBODY SCREEN: NORMAL
APTT: 24.5 SEC (ref 24.2–36.2)
AST SERPL-CCNC: 9 U/L (ref 15–37)
BARBITURATE SCREEN URINE: NORMAL
BENZODIAZEPINE SCREEN, URINE: NORMAL
BILIRUB SERPL-MCNC: <0.2 MG/DL (ref 0–1)
BILIRUBIN DIRECT: <0.2 MG/DL (ref 0–0.3)
BILIRUBIN, INDIRECT: ABNORMAL MG/DL (ref 0–1)
BUN BLDV-MCNC: 10 MG/DL (ref 7–20)
BUPRENORPHINE URINE: NORMAL
CANNABINOID SCREEN URINE: NORMAL
COCAINE METABOLITE SCREEN URINE: NORMAL
CREAT SERPL-MCNC: 0.6 MG/DL (ref 0.6–1.1)
CREATININE URINE: 78 MG/DL (ref 28–259)
FIBRINOGEN: 745 MG/DL (ref 200–397)
GFR AFRICAN AMERICAN: >60
GFR NON-AFRICAN AMERICAN: >60
GLUCOSE BLD-MCNC: 79 MG/DL (ref 70–99)
GLUCOSE BLD-MCNC: 80 MG/DL (ref 70–99)
GLUCOSE BLD-MCNC: 81 MG/DL (ref 70–99)
HCT VFR BLD CALC: 37.5 % (ref 36–48)
HEMOGLOBIN: 12.8 G/DL (ref 12–16)
INR BLD: 0.89 (ref 0.86–1.14)
Lab: NORMAL
MCH RBC QN AUTO: 29.5 PG (ref 26–34)
MCHC RBC AUTO-ENTMCNC: 34.2 G/DL (ref 31–36)
MCV RBC AUTO: 86.2 FL (ref 80–100)
METHADONE SCREEN, URINE: NORMAL
OPIATE SCREEN URINE: NORMAL
OXYCODONE URINE: NORMAL
PDW BLD-RTO: 15.3 % (ref 12.4–15.4)
PERFORMED ON: NORMAL
PERFORMED ON: NORMAL
PH UA: 5
PHENCYCLIDINE SCREEN URINE: NORMAL
PLATELET # BLD: 270 K/UL (ref 135–450)
PMV BLD AUTO: 10.1 FL (ref 5–10.5)
PROPOXYPHENE SCREEN: NORMAL
PROTEIN PROTEIN: 10 MG/DL
PROTEIN/CREAT RATIO: 0.1 MG/DL
PROTHROMBIN TIME: 10.3 SEC (ref 10–13.2)
RBC # BLD: 4.35 M/UL (ref 4–5.2)
TOTAL PROTEIN: 6.8 G/DL (ref 6.4–8.2)
URIC ACID, SERUM: 6.2 MG/DL (ref 2.6–6)
WBC # BLD: 8.8 K/UL (ref 4–11)

## 2020-01-02 PROCEDURE — 80307 DRUG TEST PRSMV CHEM ANLYZR: CPT

## 2020-01-02 PROCEDURE — 86900 BLOOD TYPING SEROLOGIC ABO: CPT

## 2020-01-02 PROCEDURE — 6370000000 HC RX 637 (ALT 250 FOR IP): Performed by: OBSTETRICS & GYNECOLOGY

## 2020-01-02 PROCEDURE — 84550 ASSAY OF BLOOD/URIC ACID: CPT

## 2020-01-02 PROCEDURE — 86850 RBC ANTIBODY SCREEN: CPT

## 2020-01-02 PROCEDURE — 84156 ASSAY OF PROTEIN URINE: CPT

## 2020-01-02 PROCEDURE — G0378 HOSPITAL OBSERVATION PER HR: HCPCS

## 2020-01-02 PROCEDURE — 85610 PROTHROMBIN TIME: CPT

## 2020-01-02 PROCEDURE — 82570 ASSAY OF URINE CREATININE: CPT

## 2020-01-02 PROCEDURE — 82565 ASSAY OF CREATININE: CPT

## 2020-01-02 PROCEDURE — 80076 HEPATIC FUNCTION PANEL: CPT

## 2020-01-02 PROCEDURE — 85384 FIBRINOGEN ACTIVITY: CPT

## 2020-01-02 PROCEDURE — 2580000003 HC RX 258: Performed by: OBSTETRICS & GYNECOLOGY

## 2020-01-02 PROCEDURE — 82947 ASSAY GLUCOSE BLOOD QUANT: CPT

## 2020-01-02 PROCEDURE — 84520 ASSAY OF UREA NITROGEN: CPT

## 2020-01-02 PROCEDURE — 86901 BLOOD TYPING SEROLOGIC RH(D): CPT

## 2020-01-02 PROCEDURE — 85730 THROMBOPLASTIN TIME PARTIAL: CPT

## 2020-01-02 PROCEDURE — 85027 COMPLETE CBC AUTOMATED: CPT

## 2020-01-02 PROCEDURE — 36415 COLL VENOUS BLD VENIPUNCTURE: CPT

## 2020-01-02 RX ORDER — ACETAMINOPHEN 325 MG/1
650 TABLET ORAL ONCE
Status: COMPLETED | OUTPATIENT
Start: 2020-01-02 | End: 2020-01-02

## 2020-01-02 RX ORDER — SODIUM CHLORIDE 0.9 % (FLUSH) 0.9 %
10 SYRINGE (ML) INJECTION EVERY 12 HOURS SCHEDULED
Status: DISCONTINUED | OUTPATIENT
Start: 2020-01-02 | End: 2020-01-04 | Stop reason: HOSPADM

## 2020-01-02 RX ORDER — SODIUM CHLORIDE 9 MG/ML
INJECTION, SOLUTION INTRAVENOUS CONTINUOUS
Status: DISCONTINUED | OUTPATIENT
Start: 2020-01-02 | End: 2020-01-04 | Stop reason: HOSPADM

## 2020-01-02 RX ORDER — DEXTROSE MONOHYDRATE 50 MG/ML
100 INJECTION, SOLUTION INTRAVENOUS PRN
Status: DISCONTINUED | OUTPATIENT
Start: 2020-01-02 | End: 2020-01-04 | Stop reason: HOSPADM

## 2020-01-02 RX ORDER — NICOTINE POLACRILEX 4 MG
15 LOZENGE BUCCAL PRN
Status: DISCONTINUED | OUTPATIENT
Start: 2020-01-02 | End: 2020-01-04 | Stop reason: HOSPADM

## 2020-01-02 RX ORDER — SODIUM CHLORIDE, SODIUM LACTATE, POTASSIUM CHLORIDE, CALCIUM CHLORIDE 600; 310; 30; 20 MG/100ML; MG/100ML; MG/100ML; MG/100ML
INJECTION, SOLUTION INTRAVENOUS CONTINUOUS
Status: DISCONTINUED | OUTPATIENT
Start: 2020-01-02 | End: 2020-01-02

## 2020-01-02 RX ORDER — DEXTROSE MONOHYDRATE 25 G/50ML
12.5 INJECTION, SOLUTION INTRAVENOUS PRN
Status: DISCONTINUED | OUTPATIENT
Start: 2020-01-02 | End: 2020-01-04 | Stop reason: HOSPADM

## 2020-01-02 RX ORDER — SODIUM CHLORIDE 0.9 % (FLUSH) 0.9 %
10 SYRINGE (ML) INJECTION PRN
Status: DISCONTINUED | OUTPATIENT
Start: 2020-01-02 | End: 2020-01-04 | Stop reason: HOSPADM

## 2020-01-02 RX ADMIN — INSULIN HUMAN 9 UNITS: 100 INJECTION, SOLUTION PARENTERAL at 22:25

## 2020-01-02 RX ADMIN — INSULIN HUMAN 35 UNITS: 100 INJECTION, SUSPENSION SUBCUTANEOUS at 22:26

## 2020-01-02 RX ADMIN — SODIUM CHLORIDE: 9 INJECTION, SOLUTION INTRAVENOUS at 18:28

## 2020-01-02 RX ADMIN — ACETAMINOPHEN 650 MG: 325 TABLET, FILM COATED ORAL at 19:16

## 2020-01-02 RX ADMIN — SODIUM CHLORIDE: 9 INJECTION, SOLUTION INTRAVENOUS at 21:29

## 2020-01-02 ASSESSMENT — PAIN SCALES - GENERAL: PAINLEVEL_OUTOF10: 5

## 2020-01-02 NOTE — FLOWSHEET NOTE
Informed patient was going to admit overnight for observation. Patient tearful. Blood pressure 154/97. Previous blood pressures  120/79 and 108/62. Will continue to monitor.

## 2020-01-03 LAB
GLUCOSE BLD-MCNC: 100 MG/DL (ref 70–99)
GLUCOSE BLD-MCNC: 123 MG/DL (ref 70–99)
GLUCOSE BLD-MCNC: 127 MG/DL (ref 70–99)
GLUCOSE BLD-MCNC: 130 MG/DL (ref 70–99)
GLUCOSE BLD-MCNC: 72 MG/DL (ref 70–99)
GLUCOSE BLD-MCNC: 83 MG/DL (ref 70–99)
PERFORMED ON: ABNORMAL
PERFORMED ON: NORMAL
PERFORMED ON: NORMAL

## 2020-01-03 PROCEDURE — 6370000000 HC RX 637 (ALT 250 FOR IP): Performed by: OBSTETRICS & GYNECOLOGY

## 2020-01-03 PROCEDURE — 6360000002 HC RX W HCPCS: Performed by: OBSTETRICS & GYNECOLOGY

## 2020-01-03 PROCEDURE — G0378 HOSPITAL OBSERVATION PER HR: HCPCS

## 2020-01-03 PROCEDURE — 96372 THER/PROPH/DIAG INJ SC/IM: CPT

## 2020-01-03 PROCEDURE — 2580000003 HC RX 258: Performed by: OBSTETRICS & GYNECOLOGY

## 2020-01-03 PROCEDURE — 59025 FETAL NON-STRESS TEST: CPT

## 2020-01-03 RX ORDER — ACETAMINOPHEN 325 MG/1
650 TABLET ORAL EVERY 4 HOURS PRN
Status: DISCONTINUED | OUTPATIENT
Start: 2020-01-03 | End: 2020-01-04 | Stop reason: HOSPADM

## 2020-01-03 RX ORDER — BETAMETHASONE SODIUM PHOSPHATE AND BETAMETHASONE ACETATE 3; 3 MG/ML; MG/ML
12 INJECTION, SUSPENSION INTRA-ARTICULAR; INTRALESIONAL; INTRAMUSCULAR; SOFT TISSUE ONCE
Status: COMPLETED | OUTPATIENT
Start: 2020-01-03 | End: 2020-01-03

## 2020-01-03 RX ADMIN — SODIUM CHLORIDE: 9 INJECTION, SOLUTION INTRAVENOUS at 05:41

## 2020-01-03 RX ADMIN — SODIUM CHLORIDE: 9 INJECTION, SOLUTION INTRAVENOUS at 22:37

## 2020-01-03 RX ADMIN — ACETAMINOPHEN 650 MG: 325 TABLET, FILM COATED ORAL at 17:58

## 2020-01-03 RX ADMIN — ACETAMINOPHEN 650 MG: 325 TABLET, FILM COATED ORAL at 07:01

## 2020-01-03 RX ADMIN — INSULIN HUMAN 70 UNITS: 100 INJECTION, SUSPENSION SUBCUTANEOUS at 21:22

## 2020-01-03 RX ADMIN — SODIUM CHLORIDE: 9 INJECTION, SOLUTION INTRAVENOUS at 13:24

## 2020-01-03 RX ADMIN — BETAMETHASONE SODIUM PHOSPHATE AND BETAMETHASONE ACETATE 12 MG: 3; 3 INJECTION, SUSPENSION INTRA-ARTICULAR; INTRALESIONAL; INTRAMUSCULAR at 18:18

## 2020-01-03 RX ADMIN — BETAMETHASONE SODIUM PHOSPHATE AND BETAMETHASONE ACETATE 12 MG: 3; 3 INJECTION, SUSPENSION INTRA-ARTICULAR; INTRALESIONAL; INTRAMUSCULAR at 04:51

## 2020-01-03 ASSESSMENT — PAIN SCALES - GENERAL
PAINLEVEL_OUTOF10: 5
PAINLEVEL_OUTOF10: 6

## 2020-01-03 NOTE — PROCEDURES
FETAL SURVEILLANCE TESTING SUMMARY    INDICATIONS:  Fetal well being    OBJECTIVE RESULTS:  Fetal heart variability: moderate  Fetal Heart Rate accelerations: yes  Baseline FHR: 130's per minute  Fetal Non-stress Test: reactive    Fetal surveillance: reassuring

## 2020-01-03 NOTE — PROGRESS NOTES
S: Pt without complaints  O: /76   Pulse 90   Temp 97.7 °F (36.5 °C) (Oral)   Resp 18   Ht 5' 8\" (1.727 m)   Wt 249 lb (112.9 kg)   BMI 37.86 kg/m²         Ctx - 3-10 min       Cx - Long, closed       Fht - 130's, reactive, moderate variabiltity, no decels >12 hours  A/P: 36+4 wks, Fetal deceleration       Continue monitoring, celestone course

## 2020-01-03 NOTE — FLOWSHEET NOTE
Spontaneous decelerations, lasting approx 60 sec, noted at 0131 at 0148; not related to contractions. Will update Dr. Zach Bell of pt's tracing and continue to monitor.

## 2020-01-03 NOTE — ANESTHESIA PRE PROCEDURE
Peace Aguilar DO       Current medications:    Current Facility-Administered Medications   Medication Dose Route Frequency Provider Last Rate Last Dose    [START ON 1/3/2020] insulin NPH (HUMULIN N;NOVOLIN N) injection vial 70 Units  70 Units Subcutaneous BID TRISTON Fisher MD        [START ON 1/3/2020] insulin regular (HUMULIN R;NOVOLIN R) injection 18 Units  18 Units Subcutaneous BID AC Jeremías Fisher MD        sodium chloride flush 0.9 % injection 10 mL  10 mL Intravenous 2 times per day Jeremías Fisher MD        sodium chloride flush 0.9 % injection 10 mL  10 mL Intravenous PRN Jeremías Fisher MD        glucose (GLUTOSE) 40 % oral gel 15 g  15 g Oral PRN Jeremías Fisher MD        dextrose 50 % IV solution  12.5 g Intravenous PRN Jeremías Fisher MD        glucagon (rDNA) injection 1 mg  1 mg Intramuscular PRN Jeremías Fisher MD        dextrose 5 % solution  100 mL/hr Intravenous PRN Jeremías Fisher MD        0.9 % sodium chloride infusion   Intravenous Continuous Jeremías Fisher  mL/hr at 20 1828         Allergies:     Allergies   Allergen Reactions    Ciprofloxacin Hives     hives      Azithromycin Rash    Cipro Xr Rash    Sulfa Antibiotics Rash and Hives       Problem List:    Patient Active Problem List   Diagnosis Code    Neck pain M54.2    Cervical radicular pain M54.12     delivery delivered O82    Pulmonary embolism and infarction, iatrogenic ([facility] [address] T81.718A, I26.99    Dizziness R42    Headache in pregnancy, antepartum O26.899, R51    NST (non-stress test) reactive Z36.89    Gestational diabetes O24.419    Decreased fetal movement affecting management of mother, antepartum O40.1       Past Medical History:        Diagnosis Date    Disc disorder of cervical region     received steroids in the past    Gestational diabetes     gestational diabetes-on insulin    Heart murmur     High blood pressure     during pregnancy    Infertility, female 1 conception on Clomid     Migraine     Postpartum depression     on zoloft started 2019    Pulmonary embolism ([address] 2017    Pulmonary embolism on right [facility])        Past Surgical History:        Procedure Laterality Date     SECTION  2015    CHOLECYSTECTOMY  2016    KNEE SURGERY Right     to treat bone spur    KNEE SURGERY  2011    TONSILLECTOMY      WISDOM TOOTH EXTRACTION         Social History:    Social History     Tobacco Use    Smoking status: Former Smoker     Packs/day: 0.50     Types: Cigarettes     Last attempt to quit: 3/24/2015     Years since quittin.7    Smokeless tobacco: Never Used   Substance Use Topics    Alcohol use: Not Currently     Comment: occ                                Counseling given: Not Answered      Vital Signs (Current):   Vitals:    20 1744 20 1758 20 1856 20 195   BP:  (!) 146/84 135/85 134/80   Pulse: 95 101 81 84   Resp:    Temp: 36.2 °C (97.2 °F)   36.8 °C (98.2 °F)   TempSrc: Tympanic   Oral   Weight: 249 lb (112.9 kg)      Height: 5' 8\" (1.727 m)                                                 BP Readings from Last 3 Encounters:   20 134/80   19 (!) 147/94   19 116/62       NPO Status: Time of last liquid consumption: 1530                        Time of last solid consumption: 1300                        Date of last liquid consumption: 20                        Date of last solid food consumption: 20    BMI:   Wt Readings from Last 3 Encounters:   20 249 lb (112.9 kg)   19 249 lb (112.9 kg)   19 238 lb (108 kg)     Body mass index is 37.86 kg/m².     CBC:   Lab Results   Component Value Date    WBC 8.8 2020    RBC 4.35 2020    RBC 5.06 2017    HGB 12.8 2020    HCT 37.5 2020    MCV 86.2 2020    RDW 15.3 2020     2020       CMP:   Lab Results   Component Value Date     2019    K 4.1 2019

## 2020-01-03 NOTE — PROGRESS NOTES
S: Pt without complaints  O: /79   Pulse 68   Temp 97.8 °F (36.6 °C) (Oral)   Resp 18   Ht 5' 8\" (1.727 m)   Wt 249 lb (112.9 kg)   BMI 37.86 kg/m²         Ctx - none       Fht - 120's, reactive - spontaneous decel to 90's for 1 min x 2 in 1 hour and since admission  A/P: 36+4 wks, Fetal deceleration       Plan Celestone course in preparation for delivery in 1-2 days unless fetus non reassuring.

## 2020-01-04 VITALS
WEIGHT: 249 LBS | SYSTOLIC BLOOD PRESSURE: 126 MMHG | TEMPERATURE: 97.9 F | DIASTOLIC BLOOD PRESSURE: 72 MMHG | RESPIRATION RATE: 18 BRPM | HEIGHT: 68 IN | HEART RATE: 79 BPM | BODY MASS INDEX: 37.74 KG/M2

## 2020-01-04 LAB
GLUCOSE BLD-MCNC: 100 MG/DL (ref 70–99)
GLUCOSE BLD-MCNC: 107 MG/DL (ref 70–99)
GLUCOSE BLD-MCNC: 116 MG/DL (ref 70–99)
GLUCOSE BLD-MCNC: 85 MG/DL (ref 70–99)
GLUCOSE BLD-MCNC: 87 MG/DL (ref 70–99)
PERFORMED ON: ABNORMAL
PERFORMED ON: NORMAL
PERFORMED ON: NORMAL

## 2020-01-04 PROCEDURE — G0378 HOSPITAL OBSERVATION PER HR: HCPCS

## 2020-01-04 PROCEDURE — 6370000000 HC RX 637 (ALT 250 FOR IP): Performed by: OBSTETRICS & GYNECOLOGY

## 2020-01-04 PROCEDURE — 2580000003 HC RX 258: Performed by: OBSTETRICS & GYNECOLOGY

## 2020-01-04 PROCEDURE — 96372 THER/PROPH/DIAG INJ SC/IM: CPT

## 2020-01-04 PROCEDURE — 6360000002 HC RX W HCPCS: Performed by: OBSTETRICS & GYNECOLOGY

## 2020-01-04 RX ORDER — HEPARIN SODIUM 5000 [USP'U]/ML
10000 INJECTION, SOLUTION INTRAVENOUS; SUBCUTANEOUS EVERY 12 HOURS
Status: DISCONTINUED | OUTPATIENT
Start: 2020-01-04 | End: 2020-01-04 | Stop reason: HOSPADM

## 2020-01-04 RX ORDER — HEPARIN SODIUM 5000 [USP'U]/ML
10000 INJECTION, SOLUTION INTRAVENOUS; SUBCUTANEOUS EVERY 12 HOURS
Qty: 10 VIAL | Refills: 2 | Status: ON HOLD | OUTPATIENT
Start: 2020-01-04 | End: 2020-01-09

## 2020-01-04 RX ADMIN — HEPARIN SODIUM 10000 UNITS: 5000 INJECTION INTRAVENOUS; SUBCUTANEOUS at 09:39

## 2020-01-04 RX ADMIN — SODIUM CHLORIDE: 9 INJECTION, SOLUTION INTRAVENOUS at 06:18

## 2020-01-04 RX ADMIN — INSULIN HUMAN 18 UNITS: 100 INJECTION, SOLUTION PARENTERAL at 09:40

## 2020-01-04 NOTE — DISCHARGE SUMMARY
Obstetrical Discharge Form    Gestational Age: 44w9d    Antepartum complications: gestational diabetes A2    Pt was admitted on 1/2/20  for poorly controlled GDMA2 and occasional fetal decelerations. Her BS improved  and there were no decelerations in more than 24h. NST was reactive.       Discharge Medication:   Insulin, heparin, PNV, Sertraline     Discharge Condition:  good    Discharge Date: 1/4/20    PLAN:  Follow up in 2 days  All questions answered

## 2020-01-04 NOTE — FLOWSHEET NOTE
Dr. Claudia Morales at [facility], tracing reviewed patient okay to come off monitor at this time.

## 2020-01-09 ENCOUNTER — HOSPITAL ENCOUNTER (INPATIENT)
Age: 35
LOS: 2 days | Discharge: HOME | DRG: 540 | End: 2020-01-11
Attending: OBSTETRICS & GYNECOLOGY | Admitting: OBSTETRICS & GYNECOLOGY
Payer: MEDICAID

## 2020-01-09 ENCOUNTER — ANESTHESIA (OUTPATIENT)
Dept: LABOR AND DELIVERY | Age: 35
DRG: 540 | End: 2020-01-09
Payer: MEDICAID

## 2020-01-09 ENCOUNTER — ANESTHESIA EVENT (OUTPATIENT)
Dept: LABOR AND DELIVERY | Age: 35
DRG: 540 | End: 2020-01-09
Payer: MEDICAID

## 2020-01-09 VITALS — DIASTOLIC BLOOD PRESSURE: 79 MMHG | OXYGEN SATURATION: 98 % | SYSTOLIC BLOOD PRESSURE: 137 MMHG

## 2020-01-09 PROBLEM — O34.219 DELIVERED BY CESAREAN DELIVERY FOLLOWING PREVIOUS CESAREAN DELIVERY: Status: ACTIVE | Noted: 2020-01-09

## 2020-01-09 LAB
A/G RATIO: 0.9 (ref 1.1–2.2)
ABO/RH: NORMAL
ALBUMIN SERPL-MCNC: 3.4 G/DL (ref 3.4–5)
ALP BLD-CCNC: 164 U/L (ref 40–129)
ALT SERPL-CCNC: 9 U/L (ref 10–40)
AMPHETAMINE SCREEN, URINE: NORMAL
ANION GAP SERPL CALCULATED.3IONS-SCNC: 12 MMOL/L (ref 3–16)
ANTIBODY SCREEN: NORMAL
ANTIBODY SCREEN: NORMAL
APTT: 24 SEC (ref 24.2–36.2)
AST SERPL-CCNC: 11 U/L (ref 15–37)
BARBITURATE SCREEN URINE: NORMAL
BASOPHILS ABSOLUTE: 0.1 K/UL (ref 0–0.2)
BASOPHILS RELATIVE PERCENT: 0.9 %
BENZODIAZEPINE SCREEN, URINE: NORMAL
BILIRUB SERPL-MCNC: <0.2 MG/DL (ref 0–1)
BILIRUBIN URINE: NEGATIVE
BLOOD, URINE: NEGATIVE
BUN BLDV-MCNC: 10 MG/DL (ref 7–20)
BUPRENORPHINE URINE: NORMAL
CALCIUM SERPL-MCNC: 9.3 MG/DL (ref 8.3–10.6)
CANNABINOID SCREEN URINE: NORMAL
CHLORIDE BLD-SCNC: 102 MMOL/L (ref 99–110)
CLARITY: CLEAR
CO2: 21 MMOL/L (ref 21–32)
COCAINE METABOLITE SCREEN URINE: NORMAL
COLOR: YELLOW
CREAT SERPL-MCNC: 0.6 MG/DL (ref 0.6–1.1)
EOSINOPHILS ABSOLUTE: 0.1 K/UL (ref 0–0.6)
EOSINOPHILS RELATIVE PERCENT: 0.8 %
FIBRINOGEN: 564 MG/DL (ref 200–397)
GFR AFRICAN AMERICAN: >60
GFR NON-AFRICAN AMERICAN: >60
GLOBULIN: 3.6 G/DL
GLUCOSE BLD-MCNC: 85 MG/DL (ref 70–99)
GLUCOSE BLD-MCNC: 86 MG/DL (ref 70–99)
GLUCOSE BLD-MCNC: 99 MG/DL (ref 70–99)
GLUCOSE URINE: NEGATIVE MG/DL
HCT VFR BLD CALC: 36.3 % (ref 36–48)
HEMOGLOBIN: 12.5 G/DL (ref 12–16)
INR BLD: 0.86 (ref 0.86–1.14)
KETONES, URINE: NEGATIVE MG/DL
LEUKOCYTE ESTERASE, URINE: NEGATIVE
LYMPHOCYTES ABSOLUTE: 1.9 K/UL (ref 1–5.1)
LYMPHOCYTES RELATIVE PERCENT: 19.9 %
Lab: NORMAL
MCH RBC QN AUTO: 29.2 PG (ref 26–34)
MCHC RBC AUTO-ENTMCNC: 34.4 G/DL (ref 31–36)
MCV RBC AUTO: 84.8 FL (ref 80–100)
METHADONE SCREEN, URINE: NORMAL
MICROSCOPIC EXAMINATION: NORMAL
MONOCYTES ABSOLUTE: 0.6 K/UL (ref 0–1.3)
MONOCYTES RELATIVE PERCENT: 6.3 %
NEUTROPHILS ABSOLUTE: 7 K/UL (ref 1.7–7.7)
NEUTROPHILS RELATIVE PERCENT: 72.1 %
NITRITE, URINE: NEGATIVE
OPIATE SCREEN URINE: NORMAL
OXYCODONE URINE: NORMAL
PDW BLD-RTO: 15.1 % (ref 12.4–15.4)
PERFORMED ON: NORMAL
PERFORMED ON: NORMAL
PH UA: 5
PH UA: 7 (ref 5–8)
PHENCYCLIDINE SCREEN URINE: NORMAL
PLATELET # BLD: 250 K/UL (ref 135–450)
PMV BLD AUTO: 10 FL (ref 5–10.5)
POTASSIUM SERPL-SCNC: 4.5 MMOL/L (ref 3.5–5.1)
PROPOXYPHENE SCREEN: NORMAL
PROTEIN UA: NEGATIVE MG/DL
PROTHROMBIN TIME: 10 SEC (ref 10–13.2)
RBC # BLD: 4.28 M/UL (ref 4–5.2)
SODIUM BLD-SCNC: 135 MMOL/L (ref 136–145)
SPECIFIC GRAVITY UA: 1.02 (ref 1–1.03)
TOTAL PROTEIN: 7 G/DL (ref 6.4–8.2)
URIC ACID, SERUM: 5.3 MG/DL (ref 2.6–6)
URINE TYPE: NORMAL
UROBILINOGEN, URINE: 0.2 E.U./DL
WBC # BLD: 9.7 K/UL (ref 4–11)

## 2020-01-09 PROCEDURE — 6370000000 HC RX 637 (ALT 250 FOR IP): Performed by: OBSTETRICS & GYNECOLOGY

## 2020-01-09 PROCEDURE — 86901 BLOOD TYPING SEROLOGIC RH(D): CPT

## 2020-01-09 PROCEDURE — 86850 RBC ANTIBODY SCREEN: CPT

## 2020-01-09 PROCEDURE — 6360000002 HC RX W HCPCS: Performed by: NURSE ANESTHETIST, CERTIFIED REGISTERED

## 2020-01-09 PROCEDURE — 3700000001 HC ADD 15 MINUTES (ANESTHESIA): Performed by: OBSTETRICS & GYNECOLOGY

## 2020-01-09 PROCEDURE — 2500000003 HC RX 250 WO HCPCS: Performed by: OBSTETRICS & GYNECOLOGY

## 2020-01-09 PROCEDURE — 2580000003 HC RX 258: Performed by: OBSTETRICS & GYNECOLOGY

## 2020-01-09 PROCEDURE — 85610 PROTHROMBIN TIME: CPT

## 2020-01-09 PROCEDURE — 6360000002 HC RX W HCPCS: Performed by: OBSTETRICS & GYNECOLOGY

## 2020-01-09 PROCEDURE — 84550 ASSAY OF BLOOD/URIC ACID: CPT

## 2020-01-09 PROCEDURE — 86900 BLOOD TYPING SEROLOGIC ABO: CPT

## 2020-01-09 PROCEDURE — 2580000003 HC RX 258: Performed by: NURSE ANESTHETIST, CERTIFIED REGISTERED

## 2020-01-09 PROCEDURE — 80053 COMPREHEN METABOLIC PANEL: CPT

## 2020-01-09 PROCEDURE — 3700000000 HC ANESTHESIA ATTENDED CARE: Performed by: OBSTETRICS & GYNECOLOGY

## 2020-01-09 PROCEDURE — 88307 TISSUE EXAM BY PATHOLOGIST: CPT

## 2020-01-09 PROCEDURE — 81003 URINALYSIS AUTO W/O SCOPE: CPT

## 2020-01-09 PROCEDURE — 7100000000 HC PACU RECOVERY - FIRST 15 MIN: Performed by: OBSTETRICS & GYNECOLOGY

## 2020-01-09 PROCEDURE — 86780 TREPONEMA PALLIDUM: CPT

## 2020-01-09 PROCEDURE — 2500000003 HC RX 250 WO HCPCS: Performed by: NURSE ANESTHETIST, CERTIFIED REGISTERED

## 2020-01-09 PROCEDURE — 1200000000 HC SEMI PRIVATE

## 2020-01-09 PROCEDURE — 2709999900 HC NON-CHARGEABLE SUPPLY: Performed by: OBSTETRICS & GYNECOLOGY

## 2020-01-09 PROCEDURE — 85730 THROMBOPLASTIN TIME PARTIAL: CPT

## 2020-01-09 PROCEDURE — 3609079900 HC CESAREAN SECTION: Performed by: OBSTETRICS & GYNECOLOGY

## 2020-01-09 PROCEDURE — 80307 DRUG TEST PRSMV CHEM ANLYZR: CPT

## 2020-01-09 PROCEDURE — 85025 COMPLETE CBC W/AUTO DIFF WBC: CPT

## 2020-01-09 PROCEDURE — 7100000001 HC PACU RECOVERY - ADDTL 15 MIN: Performed by: OBSTETRICS & GYNECOLOGY

## 2020-01-09 PROCEDURE — 85384 FIBRINOGEN ACTIVITY: CPT

## 2020-01-09 PROCEDURE — 36415 COLL VENOUS BLD VENIPUNCTURE: CPT

## 2020-01-09 RX ORDER — ONDANSETRON 2 MG/ML
INJECTION INTRAMUSCULAR; INTRAVENOUS PRN
Status: DISCONTINUED | OUTPATIENT
Start: 2020-01-09 | End: 2020-01-09 | Stop reason: SDUPTHER

## 2020-01-09 RX ORDER — MISOPROSTOL 100 UG/1
800 TABLET ORAL PRN
Status: DISCONTINUED | OUTPATIENT
Start: 2020-01-09 | End: 2020-01-11 | Stop reason: HOSPADM

## 2020-01-09 RX ORDER — OXYCODONE HYDROCHLORIDE 5 MG/1
10 TABLET ORAL EVERY 4 HOURS PRN
Status: DISCONTINUED | OUTPATIENT
Start: 2020-01-09 | End: 2020-01-11 | Stop reason: HOSPADM

## 2020-01-09 RX ORDER — KETOROLAC TROMETHAMINE 30 MG/ML
30 INJECTION, SOLUTION INTRAMUSCULAR; INTRAVENOUS EVERY 8 HOURS
Status: DISCONTINUED | OUTPATIENT
Start: 2020-01-09 | End: 2020-01-11 | Stop reason: HOSPADM

## 2020-01-09 RX ORDER — FENTANYL CITRATE 50 UG/ML
INJECTION, SOLUTION INTRAMUSCULAR; INTRAVENOUS PRN
Status: DISCONTINUED | OUTPATIENT
Start: 2020-01-09 | End: 2020-01-09 | Stop reason: SDUPTHER

## 2020-01-09 RX ORDER — IBUPROFEN 800 MG/1
800 TABLET ORAL 3 TIMES DAILY
Qty: 60 TABLET | Refills: 0 | Status: SHIPPED | OUTPATIENT
Start: 2020-01-09

## 2020-01-09 RX ORDER — CARBOPROST TROMETHAMINE 250 UG/ML
250 INJECTION, SOLUTION INTRAMUSCULAR PRN
Status: DISCONTINUED | OUTPATIENT
Start: 2020-01-09 | End: 2020-01-11 | Stop reason: HOSPADM

## 2020-01-09 RX ORDER — BUPIVACAINE HYDROCHLORIDE 7.5 MG/ML
INJECTION, SOLUTION INTRASPINAL PRN
Status: DISCONTINUED | OUTPATIENT
Start: 2020-01-09 | End: 2020-01-09 | Stop reason: SDUPTHER

## 2020-01-09 RX ORDER — TRISODIUM CITRATE DIHYDRATE AND CITRIC ACID MONOHYDRATE 500; 334 MG/5ML; MG/5ML
30 SOLUTION ORAL ONCE
Status: COMPLETED | OUTPATIENT
Start: 2020-01-09 | End: 2020-01-09

## 2020-01-09 RX ORDER — ACETAMINOPHEN 500 MG
1000 TABLET ORAL ONCE
Status: COMPLETED | OUTPATIENT
Start: 2020-01-09 | End: 2020-01-09

## 2020-01-09 RX ORDER — SIMETHICONE 80 MG
80 TABLET,CHEWABLE ORAL EVERY 6 HOURS PRN
Status: DISCONTINUED | OUTPATIENT
Start: 2020-01-09 | End: 2020-01-11 | Stop reason: HOSPADM

## 2020-01-09 RX ORDER — DEXTROSE MONOHYDRATE 50 MG/ML
100 INJECTION, SOLUTION INTRAVENOUS PRN
Status: DISCONTINUED | OUTPATIENT
Start: 2020-01-09 | End: 2020-01-11 | Stop reason: HOSPADM

## 2020-01-09 RX ORDER — KETOROLAC TROMETHAMINE 30 MG/ML
INJECTION, SOLUTION INTRAMUSCULAR; INTRAVENOUS PRN
Status: DISCONTINUED | OUTPATIENT
Start: 2020-01-09 | End: 2020-01-09 | Stop reason: SDUPTHER

## 2020-01-09 RX ORDER — LANOLIN 100 %
OINTMENT (GRAM) TOPICAL
Status: DISCONTINUED | OUTPATIENT
Start: 2020-01-09 | End: 2020-01-11 | Stop reason: HOSPADM

## 2020-01-09 RX ORDER — SODIUM CHLORIDE 0.9 % (FLUSH) 0.9 %
10 SYRINGE (ML) INJECTION PRN
Status: DISCONTINUED | OUTPATIENT
Start: 2020-01-09 | End: 2020-01-11 | Stop reason: HOSPADM

## 2020-01-09 RX ORDER — DEXTROSE MONOHYDRATE 25 G/50ML
12.5 INJECTION, SOLUTION INTRAVENOUS PRN
Status: DISCONTINUED | OUTPATIENT
Start: 2020-01-09 | End: 2020-01-11 | Stop reason: HOSPADM

## 2020-01-09 RX ORDER — INSULIN LISPRO 100 [IU]/ML
0-6 INJECTION, SOLUTION INTRAVENOUS; SUBCUTANEOUS NIGHTLY
Status: DISCONTINUED | OUTPATIENT
Start: 2020-01-09 | End: 2020-01-11 | Stop reason: HOSPADM

## 2020-01-09 RX ORDER — INSULIN LISPRO 100 [IU]/ML
0-12 INJECTION, SOLUTION INTRAVENOUS; SUBCUTANEOUS
Status: DISCONTINUED | OUTPATIENT
Start: 2020-01-09 | End: 2020-01-11 | Stop reason: HOSPADM

## 2020-01-09 RX ORDER — POLYETHYLENE GLYCOL 3350 17 G/17G
17 POWDER, FOR SOLUTION ORAL DAILY
Status: DISCONTINUED | OUTPATIENT
Start: 2020-01-09 | End: 2020-01-11 | Stop reason: HOSPADM

## 2020-01-09 RX ORDER — ALBUTEROL SULFATE 90 UG/1
2 AEROSOL, METERED RESPIRATORY (INHALATION) 4 TIMES DAILY
Status: DISCONTINUED | OUTPATIENT
Start: 2020-01-09 | End: 2020-01-11 | Stop reason: HOSPADM

## 2020-01-09 RX ORDER — SODIUM CHLORIDE 0.9 % (FLUSH) 0.9 %
10 SYRINGE (ML) INJECTION PRN
Status: DISCONTINUED | OUTPATIENT
Start: 2020-01-09 | End: 2020-01-09

## 2020-01-09 RX ORDER — OXYTOCIN 10 [USP'U]/ML
INJECTION, SOLUTION INTRAMUSCULAR; INTRAVENOUS PRN
Status: DISCONTINUED | OUTPATIENT
Start: 2020-01-09 | End: 2020-01-09 | Stop reason: SDUPTHER

## 2020-01-09 RX ORDER — FERROUS SULFATE 325(65) MG
325 TABLET ORAL 2 TIMES DAILY WITH MEALS
Status: DISCONTINUED | OUTPATIENT
Start: 2020-01-09 | End: 2020-01-11 | Stop reason: HOSPADM

## 2020-01-09 RX ORDER — MORPHINE SULFATE 1 MG/ML
INJECTION, SOLUTION EPIDURAL; INTRATHECAL; INTRAVENOUS PRN
Status: DISCONTINUED | OUTPATIENT
Start: 2020-01-09 | End: 2020-01-09 | Stop reason: SDUPTHER

## 2020-01-09 RX ORDER — METHYLERGONOVINE MALEATE 0.2 MG/ML
200 INJECTION INTRAVENOUS PRN
Status: DISCONTINUED | OUTPATIENT
Start: 2020-01-09 | End: 2020-01-11 | Stop reason: HOSPADM

## 2020-01-09 RX ORDER — OXYCODONE HYDROCHLORIDE 5 MG/1
5 TABLET ORAL EVERY 4 HOURS PRN
Status: DISCONTINUED | OUTPATIENT
Start: 2020-01-09 | End: 2020-01-11 | Stop reason: HOSPADM

## 2020-01-09 RX ORDER — DOCUSATE SODIUM 100 MG/1
100 CAPSULE, LIQUID FILLED ORAL 2 TIMES DAILY
Status: DISCONTINUED | OUTPATIENT
Start: 2020-01-09 | End: 2020-01-11 | Stop reason: HOSPADM

## 2020-01-09 RX ORDER — SODIUM CHLORIDE, SODIUM LACTATE, POTASSIUM CHLORIDE, CALCIUM CHLORIDE 600; 310; 30; 20 MG/100ML; MG/100ML; MG/100ML; MG/100ML
INJECTION, SOLUTION INTRAVENOUS CONTINUOUS
Status: DISCONTINUED | OUTPATIENT
Start: 2020-01-09 | End: 2020-01-11 | Stop reason: HOSPADM

## 2020-01-09 RX ORDER — METOCLOPRAMIDE HYDROCHLORIDE 5 MG/ML
10 INJECTION INTRAMUSCULAR; INTRAVENOUS ONCE
Status: COMPLETED | OUTPATIENT
Start: 2020-01-09 | End: 2020-01-09

## 2020-01-09 RX ORDER — BISACODYL 10 MG
10 SUPPOSITORY, RECTAL RECTAL DAILY PRN
Status: DISCONTINUED | OUTPATIENT
Start: 2020-01-09 | End: 2020-01-11 | Stop reason: HOSPADM

## 2020-01-09 RX ORDER — NICOTINE POLACRILEX 4 MG
15 LOZENGE BUCCAL PRN
Status: DISCONTINUED | OUTPATIENT
Start: 2020-01-09 | End: 2020-01-11 | Stop reason: HOSPADM

## 2020-01-09 RX ORDER — SENNA AND DOCUSATE SODIUM 50; 8.6 MG/1; MG/1
1 TABLET, FILM COATED ORAL DAILY PRN
Status: DISCONTINUED | OUTPATIENT
Start: 2020-01-09 | End: 2020-01-11 | Stop reason: HOSPADM

## 2020-01-09 RX ORDER — ONDANSETRON 2 MG/ML
4 INJECTION INTRAMUSCULAR; INTRAVENOUS EVERY 6 HOURS PRN
Status: DISCONTINUED | OUTPATIENT
Start: 2020-01-09 | End: 2020-01-11 | Stop reason: HOSPADM

## 2020-01-09 RX ORDER — IBUPROFEN 800 MG/1
800 TABLET ORAL EVERY 8 HOURS
Status: DISCONTINUED | OUTPATIENT
Start: 2020-01-09 | End: 2020-01-11 | Stop reason: HOSPADM

## 2020-01-09 RX ORDER — OXYCODONE HYDROCHLORIDE 5 MG/1
5 TABLET ORAL EVERY 6 HOURS PRN
Qty: 28 TABLET | Refills: 0 | Status: SHIPPED | OUTPATIENT
Start: 2020-01-09 | End: 2020-01-16

## 2020-01-09 RX ORDER — ACETAMINOPHEN 500 MG
1000 TABLET ORAL EVERY 8 HOURS SCHEDULED
Status: DISCONTINUED | OUTPATIENT
Start: 2020-01-09 | End: 2020-01-11 | Stop reason: HOSPADM

## 2020-01-09 RX ORDER — ONDANSETRON 8 MG/1
8 TABLET, ORALLY DISINTEGRATING ORAL EVERY 8 HOURS PRN
Status: DISCONTINUED | OUTPATIENT
Start: 2020-01-09 | End: 2020-01-11 | Stop reason: HOSPADM

## 2020-01-09 RX ORDER — DIPHENHYDRAMINE HYDROCHLORIDE 50 MG/ML
25 INJECTION INTRAMUSCULAR; INTRAVENOUS EVERY 6 HOURS PRN
Status: DISCONTINUED | OUTPATIENT
Start: 2020-01-09 | End: 2020-01-11 | Stop reason: HOSPADM

## 2020-01-09 RX ORDER — SODIUM CHLORIDE 9 MG/ML
INJECTION, SOLUTION INTRAVENOUS CONTINUOUS
Status: DISCONTINUED | OUTPATIENT
Start: 2020-01-09 | End: 2020-01-09

## 2020-01-09 RX ORDER — ACETAMINOPHEN 500 MG
1000 TABLET ORAL 3 TIMES DAILY
Qty: 50 TABLET | Refills: 0 | Status: SHIPPED | OUTPATIENT
Start: 2020-01-09

## 2020-01-09 RX ORDER — SODIUM CHLORIDE 9 MG/ML
INJECTION, SOLUTION INTRAVENOUS CONTINUOUS PRN
Status: DISCONTINUED | OUTPATIENT
Start: 2020-01-09 | End: 2020-01-09 | Stop reason: SDUPTHER

## 2020-01-09 RX ORDER — FAMOTIDINE 20 MG/1
20 TABLET, FILM COATED ORAL 2 TIMES DAILY PRN
Status: DISCONTINUED | OUTPATIENT
Start: 2020-01-09 | End: 2020-01-11 | Stop reason: HOSPADM

## 2020-01-09 RX ORDER — SODIUM CHLORIDE 0.9 % (FLUSH) 0.9 %
10 SYRINGE (ML) INJECTION EVERY 12 HOURS SCHEDULED
Status: DISCONTINUED | OUTPATIENT
Start: 2020-01-09 | End: 2020-01-11 | Stop reason: HOSPADM

## 2020-01-09 RX ADMIN — SODIUM CHLORIDE: 9 INJECTION, SOLUTION INTRAVENOUS at 12:56

## 2020-01-09 RX ADMIN — FAMOTIDINE 20 MG: 10 INJECTION, SOLUTION INTRAVENOUS at 14:28

## 2020-01-09 RX ADMIN — ACETAMINOPHEN 1000 MG: 500 TABLET, FILM COATED ORAL at 22:58

## 2020-01-09 RX ADMIN — OXYTOCIN 20 UNITS: 10 INJECTION, SOLUTION INTRAMUSCULAR; INTRAVENOUS at 15:10

## 2020-01-09 RX ADMIN — OXYTOCIN 20 UNITS: 10 INJECTION, SOLUTION INTRAMUSCULAR; INTRAVENOUS at 15:30

## 2020-01-09 RX ADMIN — KETOROLAC TROMETHAMINE 30 MG: 30 INJECTION, SOLUTION INTRAMUSCULAR; INTRAVENOUS at 15:15

## 2020-01-09 RX ADMIN — BUPIVACAINE HYDROCHLORIDE 1.6 ML: 7.5 INJECTION, SOLUTION INTRASPINAL at 14:51

## 2020-01-09 RX ADMIN — CEFAZOLIN 2 G: 1 INJECTION, POWDER, FOR SOLUTION INTRAMUSCULAR; INTRAVENOUS at 14:28

## 2020-01-09 RX ADMIN — SODIUM CHLORIDE: 9 INJECTION, SOLUTION INTRAVENOUS at 15:00

## 2020-01-09 RX ADMIN — KETOROLAC TROMETHAMINE 30 MG: 30 INJECTION, SOLUTION INTRAMUSCULAR at 22:59

## 2020-01-09 RX ADMIN — ACETAMINOPHEN 1000 MG: 500 TABLET, FILM COATED ORAL at 14:03

## 2020-01-09 RX ADMIN — METOCLOPRAMIDE 10 MG: 5 INJECTION, SOLUTION INTRAMUSCULAR; INTRAVENOUS at 14:28

## 2020-01-09 RX ADMIN — MORPHINE SULFATE 0.2 MG: 1 INJECTION EPIDURAL; INTRATHECAL; INTRAVENOUS at 14:51

## 2020-01-09 RX ADMIN — SODIUM CITRATE AND CITRIC ACID MONOHYDRATE 30 ML: 500; 334 SOLUTION ORAL at 14:03

## 2020-01-09 RX ADMIN — FENTANYL CITRATE 10 MCG: 50 INJECTION, SOLUTION INTRAMUSCULAR; INTRAVENOUS at 14:51

## 2020-01-09 RX ADMIN — SODIUM CHLORIDE: 9 INJECTION, SOLUTION INTRAVENOUS at 14:44

## 2020-01-09 RX ADMIN — SODIUM CHLORIDE: 9 INJECTION, SOLUTION INTRAVENOUS at 11:40

## 2020-01-09 RX ADMIN — ONDANSETRON 4 MG: 2 INJECTION INTRAMUSCULAR; INTRAVENOUS at 14:45

## 2020-01-09 RX ADMIN — SERTRALINE 100 MG: 50 TABLET, FILM COATED ORAL at 23:04

## 2020-01-09 RX ADMIN — SODIUM CHLORIDE: 9 INJECTION, SOLUTION INTRAVENOUS at 15:30

## 2020-01-09 ASSESSMENT — PULMONARY FUNCTION TESTS
PIF_VALUE: 0
PIF_VALUE: 2
PIF_VALUE: 0
PIF_VALUE: 1
PIF_VALUE: 0

## 2020-01-09 ASSESSMENT — PAIN SCALES - GENERAL
PAINLEVEL_OUTOF10: 5
PAINLEVEL_OUTOF10: 4
PAINLEVEL_OUTOF10: 0

## 2020-01-09 NOTE — ANESTHESIA PRE PROCEDURE
Department of Anesthesiology  Preprocedure Note       Name:  Emily Hess   Age:  36 y.o.  :  1985                                          MRN:  [id number]         Date:  2020      Surgeon: Benetta Nissen):  Paulina Waite MD    Procedure:  SECTION (N/A Abdomen)    Medications prior to admission:   Prior to Admission medications    Medication Sig Start Date End Date Taking?  Authorizing Provider   HUMULIN R 100 UNIT/ML injection Inject 18 Units into the skin 2 times daily (before meals) 18 in am  24 in pm 19  Yes Historical Provider, MD   sertraline (ZOLOFT) 50 MG tablet Take 100 mg by mouth daily  10/23/19  Yes Historical Provider, MD   Prenatal Multivit-Min-Fe-FA (PRE-YASMEEN PO) Take by mouth   Yes Historical Provider, MD   insulin NPH (HUMULIN N;NOVOLIN N) 100 UNIT/ML injection vial Inject 70 Units into the skin 2 times daily (before meals) 45 in AM   80 at HS   Yes Historical Provider, MD   Heparin Sodium, Porcine, (HEPARIN, PORCINE,) 5000 UNIT/ML injection Inject 2 mLs into the skin every 12 hours for 5 days 20  Sekou Alan MD   acarbose (PRECOSE) 25 MG tablet Take 25 mg by mouth daily 19   Historical Provider, MD   albuterol sulfate  (90 Base) MCG/ACT inhaler Inhale 2 puffs into the lungs 4 times daily Does not take 19   Historical Provider, MD   ONE TOUCH ULTRA TEST strip Inject 1 box into the skin 4 times daily 19   Historical Provider, MD   BD VEO INSULIN SYRINGE U/F 31G X 15/64\" 1 ML MISC Inject 1 box into the skin 4 times daily 19   Historical Provider, MD       Current medications:    Current Facility-Administered Medications   Medication Dose Route Frequency Provider Last Rate Last Dose    sodium chloride flush 0.9 % injection 10 mL  10 mL Intravenous PRN Paulina Waite MD        citric acid-sodium citrate (BICITRA) solution 30 mL  30 mL Oral Once Paulina Waite MD        acetaminophen (TYLENOL) tablet 1,000 mg 1,000 mg Oral Once Lachelle Villegas MD        famotidine (PEPCID) injection 20 mg  20 mg Intravenous Once Lachelle Villegas MD        metoclopramide (REGLAN) injection 10 mg  10 mg Intravenous Once Lachelle Villegas MD        ceFAZolin (ANCEF) 2 g in dextrose 5 % 100 mL IVPB  2 g Intravenous Once Lachelle Villegas MD        0.9 % sodium chloride infusion   Intravenous Continuous Shey Degroot MD        oxytocin (PITOCIN) 20 Units in sodium chloride 0.9 % 1,000 mL infusion  1 kimmy-units/min Intravenous Continuous Shey Degroot MD           Allergies:     Allergies   Allergen Reactions    Ciprofloxacin Hives     hives      Azithromycin Rash    Cipro Xr Rash    Sulfa Antibiotics Rash and Hives       Problem List:    Patient Active Problem List   Diagnosis Code    Neck pain M54.2    Cervical radicular pain M54.12     delivery delivered O82    Pulmonary embolism and infarction, iatrogenic ([facility] [address] T81.718A, I26.99    Dizziness R42    Headache in pregnancy, antepartum O26.899, R51    NST (non-stress test) reactive Z36.89    Gestational diabetes O24.419    Decreased fetal movement affecting management of mother, antepartum O40.1    Pregnancy complication, antepartum O26.90       Past Medical History:        Diagnosis Date    Disc disorder of cervical region     received steroids in the past    Gestational diabetes     gestational diabetes-on insulin    Heart murmur     High blood pressure 2019    during previous and current pregnancy    Infertility, female 1    conception on Clomid     Migraine     PCOS (polycystic ovarian syndrome)     Postpartum depression     on zoloft started 2019    Pulmonary embolism ([facility] [address] 2017    Pulmonary embolism on right [facility])        Past Surgical History:        Procedure Laterality Date     SECTION  2015    CHOLECYSTECTOMY  2016    KNEE SURGERY Right     to treat bone spur    KNEE SURGERY  2011    TONSILLECTOMY last 72 hours. Coags:   Lab Results   Component Value Date    PROTIME 10.0 01/09/2020    INR 0.86 01/09/2020    APTT 24.0 01/09/2020       HCG (If Applicable): No results found for: PREGTESTUR, PREGSERUM, HCG, HCGQUANT     ABGs: No results found for: PHART, PO2ART, VOT4HTC, GZJ8NWE, BEART, [id number]     Type & Screen (If Applicable):  No results found for: LABABO, [address]    Anesthesia Evaluation  Patient summary reviewed and Nursing notes reviewed no history of anesthetic complications:   Airway: Mallampati: II  TM distance: >3 FB   Neck ROM: full  Mouth opening: > = 3 FB Dental: normal exam         Pulmonary:Negative Pulmonary ROS and normal exam                               Cardiovascular:    (+) hypertension (gestational): moderate,                   Neuro/Psych:   Negative Neuro/Psych ROS              GI/Hepatic/Renal: Neg GI/Hepatic/Renal ROS            Endo/Other:    (+) Diabetes (gestational)Type I DM, poorly controlled, using insulin, . Abdominal:   (+) obese,         Vascular:   + PE (h/o PE- last dose of heparin 1/7 at 8pm). Anesthesia Plan      general and spinal     ASA 3     (Discussed spinal procedure and risks including but not limited to infection, changes in VS, n/v, severe headache, paresthesia, intravascular injection, rare temporary or permanent injury and failed block with GETA back up. Discussed the addition of duramorph to the spinal.  Patient understands the side effects of duramorph and agrees to its utilization.   Wt Readings from Last 3 Encounters:  01/09/20 : 248 lb (112.5 kg)  01/02/20 : 249 lb (112.9 kg)  12/30/19 : 249 lb (112.9 kg)    Temp Readings from Last 3 Encounters:  01/09/20 : 36.6 °C (97.9 °F) (Oral)  01/04/20 : 36.6 °C (97.9 °F) (Oral)  12/30/19 : 36.1 °C (97 °F) (Temporal)    BP Readings from Last 3 Encounters:  01/09/20 : (!) 148/95  01/04/20 : 126/72  12/30/19 : (!) 147/94    Pulse Readings from Last 3

## 2020-01-09 NOTE — H&P
children: Not on file    Years of education: Not on file    Highest education level: Not on file   Occupational History    Not on file   Social Needs    Financial resource strain: Not on file    Food insecurity:     Worry: Not on file     Inability: Not on file    Transportation needs:     Medical: Not on file     Non-medical: Not on file   Tobacco Use    Smoking status: Former Smoker     Packs/day: 0.50     Types: Cigarettes     Last attempt to quit: 3/24/2015     Years since quittin.8    Smokeless tobacco: Never Used   Substance and Sexual Activity    Alcohol use: Not Currently     Comment: occ    Drug use: No    Sexual activity: Yes     Partners: Male   Lifestyle    Physical activity:     Days per week: Not on file     Minutes per session: Not on file    Stress: Not on file   Relationships    Social connections:     Talks on phone: Not on file     Gets together: Not on file     Attends L[employer] service: Not on file     Active member of club or organization: Not on file     Attends meetings of clubs or organizations: Not on file     Relationship status: Not on file    Intimate partner violence:     Fear of current or ex partner: Not on file     Emotionally abused: Not on file     Physically abused: Not on file     Forced sexual activity: Not on file   Other Topics Concern    Not on file   Social History Narrative    Not on file     Family History:       Problem Relation Age of Onset    High Blood Pressure Mother     High Cholesterol Mother     High Blood Pressure Father     Asthma Father     High Cholesterol Father     Early Death Sister         accidental overdose     Diabetes Maternal Grandmother     Arthritis Maternal Grandmother     Stroke Maternal Grandmother     Birth Defects Neg Hx     Cancer Neg Hx     Depression Neg Hx     Kidney Disease Neg Hx     Heart Disease Neg Hx     Hearing Loss Neg Hx     Learning Disabilities Neg Hx     Mental Illness Neg Hx     Mental Retardation Neg Hx     Miscarriages / Stillbirths Neg Hx     Substance Abuse Neg Hx     Vision Loss Neg Hx      Medications Prior to Admission:  Medications Prior to Admission: HUMULIN R 100 UNIT/ML injection, Inject 18 Units into the skin 2 times daily (before meals) 18 in am 24 in pm  sertraline (ZOLOFT) 50 MG tablet, Take 100 mg by mouth daily   Prenatal Multivit-Min-Fe-FA (PRE-YASMEEN PO), Take by mouth  insulin NPH (HUMULIN N;NOVOLIN N) 100 UNIT/ML injection vial, Inject 70 Units into the skin 2 times daily (before meals) 45 in AM  80 at HS  Heparin Sodium, Porcine, (HEPARIN, PORCINE,) 5000 UNIT/ML injection, Inject 2 mLs into the skin every 12 hours for 5 days  acarbose (PRECOSE) 25 MG tablet, Take 25 mg by mouth daily  albuterol sulfate  (90 Base) MCG/ACT inhaler, Inhale 2 puffs into the lungs 4 times daily Does not take  ONE TOUCH ULTRA TEST strip, Inject 1 box into the skin 4 times daily  BD VEO INSULIN SYRINGE U/F 31G X 15/64\" 1 ML MISC, Inject 1 box into the skin 4 times daily    REVIEW OF SYSTEMS:    anxious    PHYSICAL EXAM:  Vitals:    20 1151 20 1206   BP:  (!) 148/95   Pulse:  83   Resp:  18   Temp:  97.9 °F (36.6 °C)   TempSrc:  Oral   Weight: 248 lb (112.5 kg)    Height: 5' 8\" (1.727 m)      General appearance:  awake, alert, cooperative, no apparent distress, and appears stated age, obese  Neurologic:  Awake, alert, oriented to name, place and time. Lungs:  No increased work of breathing, good air exchange  Abdomen:  Soft, non tender, gravid, consistent with her gestational age,   Fetal heart rate:  Reassuring.   Membranes:  Intact    Labs:   CBC:   Lab Results   Component Value Date    WBC 9.7 2020    RBC 4.28 2020    RBC 5.06 2017    HGB 12.5 2020    HCT 36.3 2020    MCV 84.8 2020    MCH 29.2 2020    MCHC 34.4 2020    RDW 15.1 2020     2020    MPV 10.0 2020       ASSESSMENT AND PLAN:    Admit for RCS, proc and all r/b/a d/w her. Discussed early term delivery and risk of RDS. ALso reviewed NB concerns about maternal labile BS. . Consented

## 2020-01-10 LAB
GLUCOSE BLD-MCNC: 102 MG/DL (ref 70–99)
GLUCOSE BLD-MCNC: 110 MG/DL (ref 70–99)
GLUCOSE BLD-MCNC: 135 MG/DL (ref 70–99)
GLUCOSE BLD-MCNC: 90 MG/DL (ref 70–99)
HCT VFR BLD CALC: 32.4 % (ref 36–48)
HEMOGLOBIN: 10.9 G/DL (ref 12–16)
MCH RBC QN AUTO: 29.4 PG (ref 26–34)
MCHC RBC AUTO-ENTMCNC: 33.6 G/DL (ref 31–36)
MCV RBC AUTO: 87.4 FL (ref 80–100)
PDW BLD-RTO: 15.5 % (ref 12.4–15.4)
PERFORMED ON: ABNORMAL
PERFORMED ON: NORMAL
PLATELET # BLD: 232 K/UL (ref 135–450)
PMV BLD AUTO: 9.8 FL (ref 5–10.5)
RBC # BLD: 3.71 M/UL (ref 4–5.2)
TOTAL SYPHILLIS IGG/IGM: NORMAL
WBC # BLD: 7.5 K/UL (ref 4–11)

## 2020-01-10 PROCEDURE — 2580000003 HC RX 258: Performed by: OBSTETRICS & GYNECOLOGY

## 2020-01-10 PROCEDURE — 6360000002 HC RX W HCPCS: Performed by: OBSTETRICS & GYNECOLOGY

## 2020-01-10 PROCEDURE — 94760 N-INVAS EAR/PLS OXIMETRY 1: CPT

## 2020-01-10 PROCEDURE — 6370000000 HC RX 637 (ALT 250 FOR IP): Performed by: OBSTETRICS & GYNECOLOGY

## 2020-01-10 PROCEDURE — 36415 COLL VENOUS BLD VENIPUNCTURE: CPT

## 2020-01-10 PROCEDURE — 85027 COMPLETE CBC AUTOMATED: CPT

## 2020-01-10 PROCEDURE — 1200000000 HC SEMI PRIVATE

## 2020-01-10 RX ADMIN — IBUPROFEN 800 MG: 800 TABLET, FILM COATED ORAL at 15:16

## 2020-01-10 RX ADMIN — ACETAMINOPHEN 1000 MG: 500 TABLET, FILM COATED ORAL at 15:16

## 2020-01-10 RX ADMIN — DOCUSATE SODIUM 100 MG: 100 CAPSULE ORAL at 20:25

## 2020-01-10 RX ADMIN — Medication 10 ML: at 12:19

## 2020-01-10 RX ADMIN — DOCUSATE SODIUM 100 MG: 100 CAPSULE ORAL at 10:04

## 2020-01-10 RX ADMIN — KETOROLAC TROMETHAMINE 30 MG: 30 INJECTION, SOLUTION INTRAMUSCULAR at 06:59

## 2020-01-10 RX ADMIN — OXYCODONE 5 MG: 5 TABLET ORAL at 20:45

## 2020-01-10 RX ADMIN — SERTRALINE 100 MG: 50 TABLET, FILM COATED ORAL at 10:04

## 2020-01-10 RX ADMIN — ACETAMINOPHEN 1000 MG: 500 TABLET, FILM COATED ORAL at 22:08

## 2020-01-10 RX ADMIN — SODIUM CHLORIDE, POTASSIUM CHLORIDE, SODIUM LACTATE AND CALCIUM CHLORIDE: 600; 310; 30; 20 INJECTION, SOLUTION INTRAVENOUS at 07:00

## 2020-01-10 RX ADMIN — ACETAMINOPHEN 1000 MG: 500 TABLET, FILM COATED ORAL at 06:59

## 2020-01-10 RX ADMIN — ENOXAPARIN SODIUM 40 MG: 40 INJECTION SUBCUTANEOUS at 10:02

## 2020-01-10 ASSESSMENT — PAIN SCALES - GENERAL
PAINLEVEL_OUTOF10: 4
PAINLEVEL_OUTOF10: 4
PAINLEVEL_OUTOF10: 6
PAINLEVEL_OUTOF10: 4

## 2020-01-10 NOTE — PROGRESS NOTES
Discharge prescriptions for motrin, tylenol and roxicodone given to pt with instructions on use and side effects. See AVS.  Pt verbalized understanding of medications.

## 2020-01-10 NOTE — PROGRESS NOTES
CLINICAL PHARMACY NOTE: MEDS TO [address] Select Patient?: No  Total # of Prescriptions Filled: 3   The following medications were delivered to the patient:  · Oxycodone 5mg  · Ibuprofen 800mg  · Tylenol ES 500mg  Total # of Interventions Completed: 0  Time Spent (min): 30    Additional Documentation:    Delivered to Patient    Nacho Villanueva CPhT

## 2020-01-10 NOTE — PROGRESS NOTES
[facility] post-partum note    Post-partum Day #1    Subjective:    No c/o, ambulating well  Lochia: some clots but staying on pad    Objective:  Vitals:    01/09/20 2230 01/10/20 0230 01/10/20 0630 01/10/20 1010   BP: 108/71 115/76 113/75 117/77   Pulse: 66 74 70 71   Resp: 16 16 16 18   Temp: 98 °F (36.7 °C) 97.9 °F (36.6 °C) 98 °F (36.7 °C) 97.9 °F (36.6 °C)   TempSrc: Oral Oral Oral Oral   SpO2: 98% 99% 98% 96%   Weight:       Height:           Physical Examination:  Appears well  Uterus: Firm, NT  Dressing C/D/I  Calves: NT    Labs: FSBS 90, 102    Recent Labs     01/09/20  1140 01/10/20  0713   WBC 9.7 7.5   HGB 12.5 10.9*   HCT 36.3 32.4*    232     Recent Labs     01/09/20  1140   *   K 4.5      CO2 21   BUN 10   CREATININE 0.6   CALCIUM 9.3   AST 11*   ALT 9*         Current Facility-Administered Medications:     albuterol sulfate  (90 Base) MCG/ACT inhaler 2 puff, 2 puff, Inhalation, 4x Daily, Shey SIDHU MD    sertraline (ZOLOFT) tablet 100 mg, 100 mg, Oral, Daily, Shey SIDHU MD, 100 mg at 01/10/20 1004    lactated ringers infusion, , Intravenous, Continuous, Emilee SIDHU MD, Stopped at 01/10/20 1219    sodium chloride flush 0.9 % injection 10 mL, 10 mL, Intravenous, 2 times per day, Emilee SIDHU MD, 10 mL at 01/10/20 1219    sodium chloride flush 0.9 % injection 10 mL, 10 mL, Intravenous, PRN, Christine Saba MD    rho(D) immune globulin (HYPERRHO S/D) injection 300 mcg, 300 mcg, Intramuscular, Once, Christine Saba MD    ketorolac (TORADOL) injection 30 mg, 30 mg, Intravenous, Q8H, Shey SIDHU MD, 30 mg at 01/10/20 0659    ibuprofen (ADVIL;MOTRIN) tablet 800 mg, 800 mg, Oral, Q8H, Shey SIDHU MD    acetaminophen (TYLENOL) tablet 1,000 mg, 1,000 mg, Oral, 3 times per day, Emilee SIDHU MD, 1,000 mg at 01/10/20 0659    oxyCODONE (ROXICODONE) immediate release tablet 5 mg, 5 mg, Oral, Q4H PRN **OR** oxyCODONE (ROXICODONE) immediate release tablet 10 mg, 10 mg, Oral, Q4H PRN, Suri Zapata MD    diphenhydrAMINE (BENADRYL) injection 25 mg, 25 mg, Intravenous, Q6H PRN, Suri Zapata MD    simethicone (MYLICON) chewable tablet 80 mg, 80 mg, Oral, Q6H PRN, Suri Zapata MD    docusate sodium (COLACE) capsule 100 mg, 100 mg, Oral, BID, Lucy SIDHU MD, 100 mg at 01/10/20 1004    polyethylene glycol (GLYCOLAX) packet 17 g, 17 g, Oral, Daily, Shey Jain MD    magnesium hydroxide (MILK OF MAGNESIA) 400 MG/5ML suspension 30 mL, 30 mL, Oral, Daily PRN, Suri Zapata MD    bisacodyl (DULCOLAX) suppository 10 mg, 10 mg, Rectal, Daily PRN, Suri Zapata MD    sennosides-docusate sodium (SENOKOT-S) 8.6-50 MG tablet 1 tablet, 1 tablet, Oral, Daily PRN, Suri Zapata MD    ondansetron (ZOFRAN) injection 4 mg, 4 mg, Intravenous, Q6H PRN, Suri Zapata MD    ondansetron (ZOFRAN-ODT) disintegrating tablet 8 mg, 8 mg, Oral, Q8H PRN, Suri Zapata MD    famotidine (PEPCID) tablet 20 mg, 20 mg, Oral, BID PRN, Suri Zapata MD    famotidine (PEPCID) injection 20 mg, 20 mg, Intravenous, BID PRN, Suri Zapata MD    oxytocin (PITOCIN) 30 units in 500 mL infusion, 1 kimmy-units/min, Intravenous, Continuous, Shey Jain MD    methylergonovine (METHERGINE) injection 200 mcg, 200 mcg, Intramuscular, PRN, Suri Zapata MD    carboprost (HEMABATE) injection 250 mcg, 250 mcg, Intramuscular, PRN, Suri Zapata MD    misoprostol (CYTOTEC) tablet 800 mcg, 800 mcg, Rectal, PRN, Suri Zapata MD    ferrous sulfate tablet 325 mg, 325 mg, Oral, BID WC, Shey Jain MD    lanolin ointment, , Topical, Q1H PRN, Lucy SIDHU MD    measles, mumps & rubella vaccine (MMR) injection 0.5 mL, 0.5 mL, Subcutaneous, Prior to discharge, Suri Zapata MD    Tetanus-Diphth-Acell Pertussis (BOOSTRIX) injection 0.5 mL, 0.5 mL, Intramuscular, Prior to discharge, Roger Ridley MD    enoxaparin (LOVENOX) injection 40 mg, 40 mg, Subcutaneous, Daily, Shey SIDHU MD, 40 mg at 01/10/20 1002    glucose (GLUTOSE) 40 % oral gel 15 g, 15 g, Oral, PRN, Roger Ridley MD    dextrose 50 % IV solution, 12.5 g, Intravenous, PRN, Roger Ridley MD    glucagon (rDNA) injection 1 mg, 1 mg, Intramuscular, PRN, Chele SIDHU MD    dextrose 5 % solution, 100 mL/hr, Intravenous, PRN, Roger Ridley MD    insulin lispro (1 Unit Dial) 0-12 Units, 0-12 Units, Subcutaneous, TID WC, Shey SIDHU MD    insulin lispro (1 Unit Dial) 0-6 Units, 0-6 Units, Subcutaneous, Nightly, Shey Arroyo MD     Assessment/Plan:      Post Partum: Continue Postpartum care  GDMA2: FSBS WNL

## 2020-01-11 VITALS
SYSTOLIC BLOOD PRESSURE: 126 MMHG | RESPIRATION RATE: 18 BRPM | BODY MASS INDEX: 37.59 KG/M2 | TEMPERATURE: 98.7 F | WEIGHT: 248 LBS | HEART RATE: 74 BPM | DIASTOLIC BLOOD PRESSURE: 84 MMHG | OXYGEN SATURATION: 97 % | HEIGHT: 68 IN

## 2020-01-11 LAB
GLUCOSE BLD-MCNC: 105 MG/DL (ref 70–99)
GLUCOSE BLD-MCNC: 118 MG/DL (ref 70–99)
PERFORMED ON: ABNORMAL
PERFORMED ON: ABNORMAL

## 2020-01-11 PROCEDURE — 6360000002 HC RX W HCPCS: Performed by: OBSTETRICS & GYNECOLOGY

## 2020-01-11 PROCEDURE — 6370000000 HC RX 637 (ALT 250 FOR IP): Performed by: OBSTETRICS & GYNECOLOGY

## 2020-01-11 RX ADMIN — ACETAMINOPHEN 1000 MG: 500 TABLET, FILM COATED ORAL at 05:49

## 2020-01-11 RX ADMIN — IBUPROFEN 800 MG: 800 TABLET, FILM COATED ORAL at 02:39

## 2020-01-11 RX ADMIN — IBUPROFEN 800 MG: 800 TABLET, FILM COATED ORAL at 11:12

## 2020-01-11 RX ADMIN — ENOXAPARIN SODIUM 40 MG: 40 INJECTION SUBCUTANEOUS at 11:12

## 2020-01-11 ASSESSMENT — PAIN SCALES - GENERAL
PAINLEVEL_OUTOF10: 3
PAINLEVEL_OUTOF10: 4

## 2020-01-11 NOTE — PROGRESS NOTES
[facility] post-partum note    Post-partum Day #2    Subjective:    Doing well, tolerating PO, flatus passed, voiding, ambulating, tolerating PO, appropriate pain management  Lochia: Normal    Objective:  Vitals:    01/10/20 1907 01/10/20 2100 01/11/20 0549 01/11/20 1108   BP:  116/73 102/69 126/84   Pulse:  68 68 74   Resp: 18 18 18 18   Temp:  98.1 °F (36.7 °C) 97.1 °F (36.2 °C) 98.7 °F (37.1 °C)   TempSrc:  Oral Oral Oral   SpO2: 97%  97%    Weight:       Height:           Physical Examination:  Appears well  Uterus: Firm, NT, BS+, I=CDI  Calves: NT    Labs:    Recent Labs     01/09/20  1140 01/10/20  0713   WBC 9.7 7.5   HGB 12.5 10.9*   HCT 36.3 32.4*    232     Recent Labs     01/09/20  1140   *   K 4.5      CO2 21   BUN 10   CREATININE 0.6   CALCIUM 9.3   AST 11*   ALT 9*         Current Facility-Administered Medications:     albuterol sulfate  (90 Base) MCG/ACT inhaler 2 puff, 2 puff, Inhalation, 4x Daily, Shey SIDHU MD    sertraline (ZOLOFT) tablet 100 mg, 100 mg, Oral, Daily, Shey SIDHU MD, 100 mg at 01/10/20 1004    lactated ringers infusion, , Intravenous, Continuous, Shey SIDHU MD, Stopped at 01/10/20 1219    sodium chloride flush 0.9 % injection 10 mL, 10 mL, Intravenous, 2 times per day, Mary Grace SIDHU MD, 10 mL at 01/10/20 1219    sodium chloride flush 0.9 % injection 10 mL, 10 mL, Intravenous, PRN, Jess Benedict MD    rho(D) immune globulin (HYPERRHO S/D) injection 300 mcg, 300 mcg, Intramuscular, Once, Jess Benedict MD    ketorolac (TORADOL) injection 30 mg, 30 mg, Intravenous, Q8H, Shey SIDHU MD, 30 mg at 01/10/20 0659    ibuprofen (ADVIL;MOTRIN) tablet 800 mg, 800 mg, Oral, Q8H, Shey SIDHU MD, 800 mg at 01/11/20 1112    acetaminophen (TYLENOL) tablet 1,000 mg, 1,000 mg, Oral, 3 times per day, Jess Benedict MD, 1,000 mg at 01/11/20 0549    oxyCODONE (ROXICODONE) immediate release tablet

## 2020-01-11 NOTE — FLOWSHEET NOTE
Postpartum and infant care teaching completed and forms signed by patient. Copy witnessed by RN and given to patient. Patient verbalized understanding of all teaching points. Prescriptions given. Patient plans to follow-up with [facility] Provider as instructed. Patient verbalizes understanding of discharge instructions and denies further questions. ID bands checked. Mother's ID band and one of baby's ID bands removed and taped to footprint sheet, signed by patient and witnessed by RN. Patient discharged in stable condition accompanied by family/guardian. Discharged in wheelchair, holding baby in arms.

## 2020-07-02 ENCOUNTER — APPOINTMENT (OUTPATIENT)
Dept: GENERAL RADIOLOGY | Age: 35
End: 2020-07-02
Payer: MEDICAID

## 2020-07-02 ENCOUNTER — HOSPITAL ENCOUNTER (EMERGENCY)
Age: 35
Discharge: HOME | End: 2020-07-02
Attending: EMERGENCY MEDICINE
Payer: MEDICAID

## 2020-07-02 ENCOUNTER — APPOINTMENT (OUTPATIENT)
Dept: CT IMAGING | Age: 35
End: 2020-07-02
Payer: MEDICAID

## 2020-07-02 VITALS
TEMPERATURE: 98.2 F | DIASTOLIC BLOOD PRESSURE: 77 MMHG | WEIGHT: 190 LBS | HEART RATE: 63 BPM | HEIGHT: 68 IN | OXYGEN SATURATION: 97 % | SYSTOLIC BLOOD PRESSURE: 133 MMHG | BODY MASS INDEX: 28.79 KG/M2 | RESPIRATION RATE: 18 BRPM

## 2020-07-02 LAB
A/G RATIO: 1.3 (ref 1.1–2.2)
ALBUMIN SERPL-MCNC: 3.9 G/DL (ref 3.4–5)
ALP BLD-CCNC: 89 U/L (ref 40–129)
ALT SERPL-CCNC: 45 U/L (ref 10–40)
ANION GAP SERPL CALCULATED.3IONS-SCNC: 11 MMOL/L (ref 3–16)
AST SERPL-CCNC: 29 U/L (ref 15–37)
BACTERIA: ABNORMAL /HPF
BASOPHILS ABSOLUTE: 0 K/UL (ref 0–0.2)
BASOPHILS RELATIVE PERCENT: 0.6 %
BILIRUB SERPL-MCNC: 0.3 MG/DL (ref 0–1)
BILIRUBIN URINE: NEGATIVE
BLOOD, URINE: NEGATIVE
BUN BLDV-MCNC: 11 MG/DL (ref 7–20)
CALCIUM SERPL-MCNC: 9.1 MG/DL (ref 8.3–10.6)
CHLORIDE BLD-SCNC: 102 MMOL/L (ref 99–110)
CLARITY: CLEAR
CO2: 22 MMOL/L (ref 21–32)
COLOR: YELLOW
CREAT SERPL-MCNC: 0.8 MG/DL (ref 0.6–1.1)
EKG ATRIAL RATE: 63 BPM
EKG DIAGNOSIS: NORMAL
EKG P AXIS: 16 DEGREES
EKG P-R INTERVAL: 162 MS
EKG Q-T INTERVAL: 414 MS
EKG QRS DURATION: 90 MS
EKG QTC CALCULATION (BAZETT): 423 MS
EKG R AXIS: 25 DEGREES
EKG T AXIS: 27 DEGREES
EKG VENTRICULAR RATE: 63 BPM
EOSINOPHILS ABSOLUTE: 0.1 K/UL (ref 0–0.6)
EOSINOPHILS RELATIVE PERCENT: 2.2 %
EPITHELIAL CELLS, UA: 2 /HPF (ref 0–5)
GFR AFRICAN AMERICAN: >60
GFR NON-AFRICAN AMERICAN: >60
GLOBULIN: 3.1 G/DL
GLUCOSE BLD-MCNC: 116 MG/DL (ref 70–99)
GLUCOSE URINE: NEGATIVE MG/DL
HCG(URINE) PREGNANCY TEST: NEGATIVE
HCT VFR BLD CALC: 41.6 % (ref 36–48)
HEMOGLOBIN: 14 G/DL (ref 12–16)
HYALINE CASTS: 2 /LPF (ref 0–8)
KETONES, URINE: ABNORMAL MG/DL
LEUKOCYTE ESTERASE, URINE: ABNORMAL
LIPASE: 28 U/L (ref 13–60)
LYMPHOCYTES ABSOLUTE: 1.5 K/UL (ref 1–5.1)
LYMPHOCYTES RELATIVE PERCENT: 23.1 %
MCH RBC QN AUTO: 29.6 PG (ref 26–34)
MCHC RBC AUTO-ENTMCNC: 33.6 G/DL (ref 31–36)
MCV RBC AUTO: 88.2 FL (ref 80–100)
MICROSCOPIC EXAMINATION: YES
MONOCYTES ABSOLUTE: 0.4 K/UL (ref 0–1.3)
MONOCYTES RELATIVE PERCENT: 6 %
NEUTROPHILS ABSOLUTE: 4.4 K/UL (ref 1.7–7.7)
NEUTROPHILS RELATIVE PERCENT: 68.1 %
NITRITE, URINE: POSITIVE
PDW BLD-RTO: 15.6 % (ref 12.4–15.4)
PH UA: 6 (ref 5–8)
PLATELET # BLD: 219 K/UL (ref 135–450)
PMV BLD AUTO: 9.2 FL (ref 5–10.5)
POTASSIUM SERPL-SCNC: 4.6 MMOL/L (ref 3.5–5.1)
PROTEIN UA: NEGATIVE MG/DL
RBC # BLD: 4.71 M/UL (ref 4–5.2)
RBC UA: 5 /HPF (ref 0–4)
SODIUM BLD-SCNC: 135 MMOL/L (ref 136–145)
SPECIFIC GRAVITY UA: 1.02 (ref 1–1.03)
TOTAL PROTEIN: 7 G/DL (ref 6.4–8.2)
TROPONIN: <0.01 NG/ML
URINE REFLEX TO CULTURE: YES
URINE TYPE: ABNORMAL
UROBILINOGEN, URINE: 0.2 E.U./DL
WBC # BLD: 6.5 K/UL (ref 4–11)
WBC UA: 11 /HPF (ref 0–5)

## 2020-07-02 PROCEDURE — 96375 TX/PRO/DX INJ NEW DRUG ADDON: CPT

## 2020-07-02 PROCEDURE — 70498 CT ANGIOGRAPHY NECK: CPT

## 2020-07-02 PROCEDURE — 99284 EMERGENCY DEPT VISIT MOD MDM: CPT

## 2020-07-02 PROCEDURE — 6360000004 HC RX CONTRAST MEDICATION: Performed by: PHYSICIAN ASSISTANT

## 2020-07-02 PROCEDURE — 96365 THER/PROPH/DIAG IV INF INIT: CPT

## 2020-07-02 PROCEDURE — 93005 ELECTROCARDIOGRAM TRACING: CPT | Performed by: PHYSICIAN ASSISTANT

## 2020-07-02 PROCEDURE — 80053 COMPREHEN METABOLIC PANEL: CPT

## 2020-07-02 PROCEDURE — 84484 ASSAY OF TROPONIN QUANT: CPT

## 2020-07-02 PROCEDURE — 85025 COMPLETE CBC W/AUTO DIFF WBC: CPT

## 2020-07-02 PROCEDURE — 87086 URINE CULTURE/COLONY COUNT: CPT

## 2020-07-02 PROCEDURE — 6360000002 HC RX W HCPCS: Performed by: PHYSICIAN ASSISTANT

## 2020-07-02 PROCEDURE — 96366 THER/PROPH/DIAG IV INF ADDON: CPT

## 2020-07-02 PROCEDURE — 93010 ELECTROCARDIOGRAM REPORT: CPT | Performed by: INTERNAL MEDICINE

## 2020-07-02 PROCEDURE — 71045 X-RAY EXAM CHEST 1 VIEW: CPT

## 2020-07-02 PROCEDURE — 6370000000 HC RX 637 (ALT 250 FOR IP): Performed by: PHYSICIAN ASSISTANT

## 2020-07-02 PROCEDURE — 81001 URINALYSIS AUTO W/SCOPE: CPT

## 2020-07-02 PROCEDURE — 83690 ASSAY OF LIPASE: CPT

## 2020-07-02 PROCEDURE — 84703 CHORIONIC GONADOTROPIN ASSAY: CPT

## 2020-07-02 PROCEDURE — 70450 CT HEAD/BRAIN W/O DYE: CPT

## 2020-07-02 RX ORDER — MAGNESIUM SULFATE 1 G/100ML
1 INJECTION INTRAVENOUS ONCE
Status: COMPLETED | OUTPATIENT
Start: 2020-07-02 | End: 2020-07-02

## 2020-07-02 RX ORDER — ONDANSETRON 2 MG/ML
4 INJECTION INTRAMUSCULAR; INTRAVENOUS ONCE
Status: COMPLETED | OUTPATIENT
Start: 2020-07-02 | End: 2020-07-02

## 2020-07-02 RX ORDER — CEPHALEXIN 500 MG/1
500 CAPSULE ORAL 2 TIMES DAILY
Qty: 10 CAPSULE | Refills: 0 | Status: SHIPPED | OUTPATIENT
Start: 2020-07-02 | End: 2020-07-07

## 2020-07-02 RX ORDER — HYDROXYZINE PAMOATE 25 MG/1
25 CAPSULE ORAL 3 TIMES DAILY PRN
Qty: 20 CAPSULE | Refills: 0 | Status: SHIPPED | OUTPATIENT
Start: 2020-07-02

## 2020-07-02 RX ORDER — DEXAMETHASONE SODIUM PHOSPHATE 10 MG/ML
10 INJECTION, SOLUTION INTRAMUSCULAR; INTRAVENOUS ONCE
Status: COMPLETED | OUTPATIENT
Start: 2020-07-02 | End: 2020-07-02

## 2020-07-02 RX ORDER — HYDROXYZINE PAMOATE 25 MG/1
50 CAPSULE ORAL ONCE
Status: COMPLETED | OUTPATIENT
Start: 2020-07-02 | End: 2020-07-02

## 2020-07-02 RX ADMIN — HYDROXYZINE PAMOATE 50 MG: 25 CAPSULE ORAL at 08:03

## 2020-07-02 RX ADMIN — MAGNESIUM SULFATE HEPTAHYDRATE 1 G: 1 INJECTION, SOLUTION INTRAVENOUS at 08:03

## 2020-07-02 RX ADMIN — DEXAMETHASONE SODIUM PHOSPHATE 10 MG: 10 INJECTION, SOLUTION INTRAMUSCULAR; INTRAVENOUS at 07:59

## 2020-07-02 RX ADMIN — ONDANSETRON 4 MG: 2 INJECTION INTRAMUSCULAR; INTRAVENOUS at 08:03

## 2020-07-02 RX ADMIN — IOPAMIDOL 75 ML: 755 INJECTION, SOLUTION INTRAVENOUS at 08:59

## 2020-07-02 ASSESSMENT — ENCOUNTER SYMPTOMS
PHOTOPHOBIA: 1
RECTAL PAIN: 0
STRIDOR: 0
COUGH: 0
VOMITING: 0
NAUSEA: 1
EYE ITCHING: 0
EYE DISCHARGE: 0
ANAL BLEEDING: 0
BACK PAIN: 0
COLOR CHANGE: 0
DIARRHEA: 0
WHEEZING: 0
SHORTNESS OF BREATH: 0
ABDOMINAL DISTENTION: 0
ABDOMINAL PAIN: 1
BLOOD IN STOOL: 0
EYE PAIN: 0
CONSTIPATION: 0
EYE REDNESS: 0

## 2020-07-02 ASSESSMENT — PAIN DESCRIPTION - PAIN TYPE: TYPE: ACUTE PAIN

## 2020-07-02 ASSESSMENT — PAIN SCALES - GENERAL: PAINLEVEL_OUTOF10: 5

## 2020-07-02 NOTE — ED PROVIDER NOTES
[facility]        Pt Name: Joy Trinh  MRN: [id number]  [place] 1985  Date of evaluation: 7/2/2020  Provider: Brigitte Canela PA-C  PCP: Anabela Lopez MD     I have seen and evaluated this patient with my supervising physician Dr Jamir Bhardwaj   Patient presents with    Headache     Pt to er with c/o headache and abdo pain since arguement with her  on sunday. pt states struggling with postpartum depression and anxiety. dneies SOB or CP. no injury to head       HISTORY OF PRESENT ILLNESS   (Location, Timing/Onset, Context/Setting, Quality, Duration, Modifying Factors, Severity, Associated Signs and Symptoms)  Note limiting factors. Joy Trinh is a 36 y.o. female who presents to the emergency department complaining of intermittent occipital and periorbital headaches and a burning sensation in her upper epigastric abdomen. She reports that this started after an argument with her  on Sunday. She took some Tums and the burning sensation went away. She took medication for headache over-the-counter and symptoms went away. However, she has been getting the symptoms intermittently when she is stressed over the past few days. Patient is very anxious and tearful. She admits that she has anxiety and postpartum depression since January. She states that she had a rough pregnancy and was showing signs of depression during the pregnancy, therefore Dr. Camilo [place], her OB/GYN placed her on Zoloft 6 months into her pregnancy. She is still taking this medication and has been compliant with it. She also sees a counselor, Jose Taylor, with [facility]. She does use medical marijuana as needed for anxiety and depression. She denies suicidal or homicidal ideation or hallucinations. She does not have a burning sensation in her upper epigastric region at this time. She does not report nausea, vomiting, diarrhea, constipation, blood or black stool, abnormal vaginal bleeding or discharge or urinary symptoms. She currently rates her left occipital headache to be a 5 out of 10 on pain scale. She does have history of migraine headaches over 10 years ago but states that she typically got her migraine headaches in the front of the scalp and not the back. She has had issues with high blood pressure readings but typically only when she visits the doctor. When she checks her blood pressure at home, she states that it is normal.  This is not the worst headache of her life, thunderclap description, abrupt onset or temporal in origin. Nursing Notes were all reviewed and agreed with or any disagreements were addressed in the HPI. REVIEW OF SYSTEMS    (2-9 systems for level 4, 10 or more for level 5)     Review of Systems   Constitutional: Negative for chills and fever. HENT: Negative. Eyes: Positive for photophobia. Negative for pain, discharge, redness, itching and visual disturbance. Respiratory: Negative for cough, shortness of breath, wheezing and stridor. Cardiovascular: Negative for chest pain, palpitations and leg swelling. Gastrointestinal: Positive for abdominal pain and nausea. Negative for abdominal distention, anal bleeding, blood in stool, constipation, diarrhea, rectal pain and vomiting. Endocrine: Negative. Genitourinary: Negative. Musculoskeletal: Negative for back pain, neck pain and neck stiffness. Skin: Negative for color change, pallor, rash and wound. Neurological: Positive for headaches. Negative for dizziness, tremors, seizures, syncope, facial asymmetry, speech difficulty, weakness, light-headedness and numbness. Psychiatric/Behavioral: Negative for agitation, confusion, hallucinations, self-injury, sleep disturbance and suicidal ideas. The patient is nervous/anxious. All other systems reviewed and are negative.       Positives and Pertinent negatives as per HPI. Except as noted above in the ROS, all other systems were reviewed and negative.        PAST MEDICAL HISTORY     Past Medical History:   Diagnosis Date    Disc disorder of cervical region     received steroids in the past    Gestational diabetes     gestational diabetes-on insulin    Heart murmur     High blood pressure     during previous and current pregnancy    Infertility, female 1    conception on Clomid     Migraine     PCOS (polycystic ovarian syndrome)     Postpartum depression     on zoloft started 2019    Pulmonary embolism ([facility] [address] 2017    Pulmonary embolism on right [facility])          SURGICAL HISTORY     Past Surgical History:   Procedure Laterality Date     SECTION  2015     SECTION N/A 2020     SECTION performed by [facility]z [facility] at [facility] OR    CHOLECYSTECTOMY  2016    KNEE SURGERY Right     to treat bone spur    KNEE SURGERY  2011    TONSILLECTOMY      WISDOM TOOTH EXTRACTION           CURRENTMEDICATIONS       Previous Medications    ACETAMINOPHEN (APAP EXTRA STRENGTH) 500 MG TABLET    Take 2 tablets by mouth three times daily    ALBUTEROL SULFATE  (90 BASE) MCG/ACT INHALER    Inhale 2 puffs into the lungs 4 times daily Does not take    IBUPROFEN (ADVIL;MOTRIN) 800 MG TABLET    Take 1 tablet by mouth three times daily    ONE TOUCH ULTRA TEST STRIP    Inject 1 box into the skin 4 times daily    PRENATAL MULTIVIT-MIN-FE-FA (PRE-YASMEEN PO)    Take by mouth    SERTRALINE (ZOLOFT) 50 MG TABLET    Take 100 mg by mouth daily          ALLERGIES     Ciprofloxacin; Azithromycin; Cipro xr; and Sulfa antibiotics    FAMILYHISTORY       Family History   Problem Relation Age of Onset    High Blood Pressure Mother     High Cholesterol Mother     High Blood Pressure Father     Asthma Father     High Cholesterol Father     Early Death Sister         accidental overdose     Diabetes Maternal Grandmother  Arthritis Maternal Grandmother     Stroke Maternal Grandmother     Birth Defects Neg Hx     Cancer Neg Hx     Depression Neg Hx     Kidney Disease Neg Hx     Heart Disease Neg Hx     Hearing Loss Neg Hx     Learning Disabilities Neg Hx     Mental Illness Neg Hx     Mental Retardation Neg Hx     Miscarriages / Stillbirths Neg Hx     Substance Abuse Neg Hx     Vision Loss Neg Hx           SOCIAL HISTORY       Social History     Tobacco Use    Smoking status: Current Some Day Smoker     Packs/day: 0.50     Types: Cigarettes     Last attempt to quit: 3/24/2015     Years since quittin.2    Smokeless tobacco: Never Used   Substance Use Topics    Alcohol use: Yes     Comment: occ    Drug use: No       SCREENINGS    [place] Coma Scale  Eye Opening: Spontaneous  Best Verbal Response: Oriented  Best Motor Response: Obeys commands  [place] Coma Scale Score: 15        PHYSICAL EXAM    (up to 7 for level 4, 8 or more for level 5)     ED Triage Vitals [20 0709]   BP Temp Temp Source Pulse Resp SpO2 Height Weight   (!) 171/108 98.2 °F (36.8 °C) Oral 87 18 100 % 5' 8\" (1.727 m) 190 lb (86.2 kg)       Physical Exam  Vitals signs and nursing note reviewed. Constitutional:       Appearance: Normal appearance. She is well-developed. She is not toxic-appearing or diaphoretic. HENT:      Head: Normocephalic and atraumatic. Comments: No temporal tenderness or pulsatile mass     Right Ear: Hearing, tympanic membrane, ear canal and external ear normal.      Left Ear: Hearing, tympanic membrane, ear canal and external ear normal.      Nose: Nose normal.      Right Sinus: No maxillary sinus tenderness or frontal sinus tenderness. Left Sinus: No maxillary sinus tenderness or frontal sinus tenderness. Mouth/Throat:      Lips: Pink. Mouth: Mucous membranes are moist.      Dentition: No dental tenderness. Pharynx: Oropharynx is clear. Uvula midline.       Tonsils: No tonsillar exudate or tonsillar abscesses. 1+ on the right. 1+ on the left. Eyes:      General: No scleral icterus. Right eye: No discharge. Left eye: No discharge. Extraocular Movements: Extraocular movements intact. Conjunctiva/sclera: Conjunctivae normal.      Pupils: Pupils are equal, round, and reactive to light. Neck:      Musculoskeletal: Full passive range of motion without pain, normal range of motion and neck supple. Trachea: Trachea and phonation normal.      Meningeal: Brudzinski's sign and Kernig's sign absent. Cardiovascular:      Rate and Rhythm: Normal rate. Pulmonary:      Effort: Pulmonary effort is normal.      Breath sounds: Normal breath sounds. Abdominal:      General: Bowel sounds are normal. There is no distension. Palpations: Abdomen is soft. Tenderness: There is no abdominal tenderness. There is no right CVA tenderness or left CVA tenderness. Musculoskeletal: Normal range of motion. Comments: No extremity edema, posterior calf or thigh tenderness, palpable cord, discoloration  Negative homans   Skin:     General: Skin is warm and dry. Capillary Refill: Capillary refill takes less than 2 seconds. Coloration: Skin is not jaundiced or pale. Findings: No bruising, erythema, lesion or rash. Neurological:      General: No focal deficit present. Mental Status: She is alert and oriented to person, place, and time. Cranial Nerves: No cranial nerve deficit. Sensory: No sensory deficit. Motor: No weakness. Gait: Gait normal.      Deep Tendon Reflexes: Reflexes normal.      Comments: No pronator drift, facial droop or slurred speech. Normal finger to nose coordination. Normal rapid alternating hand movement. Normal heel to shin coordination   [place] Hall[place] negative without nystagmus. 5 out of 5 strength in all 4 extremities without focal weakness, paresthesia or radiculopathy.    Psychiatric:         Attention and Perception: Attention and perception normal.         Mood and Affect: Mood is anxious and depressed. Speech: Speech normal.         Behavior: Behavior normal. Behavior is cooperative. Thought Content: Thought content normal.         Cognition and Memory: Cognition and memory normal.         Judgment: Judgment normal.       Upon reevaluation, patient remains neurologically intact. Her headache is resolved. She appears much less anxious. She states that medication did help.   DIAGNOSTIC RESULTS   LABS:    Labs Reviewed   CBC WITH AUTO DIFFERENTIAL - Abnormal; Notable for the following components:       Result Value    RDW 15.6 (*)     All other components within normal limits    Narrative:     Performed at:  [facility] [address], [place], [address]   Phone [phone]   COMPREHENSIVE METABOLIC PANEL - Abnormal; Notable for the following components:    Sodium 135 (*)     Glucose 116 (*)     ALT 45 (*)     All other components within normal limits    Narrative:     Performed at:  [facility] [address], [place], [facility] [employer]   Phone [phone]   URINE RT REFLEX TO CULTURE - Abnormal; Notable for the following components:    Ketones, Urine TRACE (*)     Nitrite, Urine POSITIVE (*)     Leukocyte Esterase, Urine SMALL (*)     All other components within normal limits    Narrative:     Performed at:  [facility] [address], [place], [facility] [employer]   Phone [phone]   MICROSCOPIC URINALYSIS - Abnormal; Notable for the following components:    Bacteria, UA 2+ (*)     WBC, UA 11 (*)     RBC, UA 5 (*)     All other components within normal limits    Narrative:     Performed at:  [facility] [address], [place], [address]   Phone [phone]   CULTURE, URINE   LIPASE    Narrative:     Performed at:  [facility]  [address], [place]   Phone [phone]   PREGNANCY, URINE    Narrative:     Performed at:  [facility]  [address],  [place], [place]   Phone [phone]   TROPONIN    Narrative:     Performed at:  [facility]  [address], [place]   Phone [phone]       All other labs were within normal range or not returned as of this dictation. EKG: All EKG's are interpreted by the Emergency Department Physician in the absence of a cardiologist.  Please see their note for interpretation of EKG. RADIOLOGY:   Non-plain film images such as CT, Ultrasound and MRI are read by the radiologist. Plain radiographic images are visualized and preliminarily interpreted by the ED Provider with the below findings:        Interpretation per the Radiologist below, if available at the time of this note:    CTA HEAD NECK W CONTRAST   Preliminary Result   Unremarkable CTA of the head and neck. CT HEAD WO CONTRAST   Final Result   Unremarkable noncontrast CT examination of the brain. XR CHEST PORTABLE   Final Result   No acute cardiopulmonary disease.                  PROCEDURES   Unless otherwise noted below, none     Procedures    CRITICAL CARE TIME   N/A    CONSULTS:  None      EMERGENCY DEPARTMENT COURSE and DIFFERENTIAL DIAGNOSIS/MDM:   Vitals:    Vitals:    07/02/20 0709 07/02/20 0745 07/02/20 0800   BP: (!) 171/108 133/79 120/73   Pulse: 87 65 58   Resp: 18     Temp: 98.2 °F (36.8 °C)     TempSrc: Oral     SpO2: 100% 98% 97%   Weight: 190 lb (86.2 kg)     Height: 5' 8\" (1.727 m)         Patient was given the following medications:  Medications   magnesium sulfate 1 g in dextrose 5% 100 mL IVPB (1 g Intravenous New Bag 7/2/20 0803)   dexamethasone (PF) (DECADRON) injection 10 mg (10 mg Intravenous Given 7/2/20 8719)   hydrOXYzine (VISTARIL) capsule 50 mg (50 mg Oral Given 7/2/20 0803) ondansetron [facility]) injection 4 mg (4 mg Intravenous Given 7/2/20 0803)   iopamidol (ISOVUE-370) 76 % injection 75 mL (75 mLs Intravenous Given 7/2/20 0859)           This patient presents to the emergency department with intermittent headaches and a burning sensation in her upper abdomen for several days after a verbal altercation with her . She appears anxious and depressed. She has a history of both of this. She sees a counselor. She denies suicidal or homicidal ideation or hallucinations. She is neurologically intact. She drove herself to the emergency department and walked in with a steady gait. Abdomen is soft and nontender in all 4 quadrants without pulsatile mass or CVA tenderness. Chest x-ray is unremarkable. Blood work is stable. CT scan of the brain is unremarkable.   My suspicion is low for cerebellar compromise, posterior stroke, pneumonia, pneumothorax, COVID-19,carotid dissection, sinus abscess, acute fracture, acute CVA, ICH, SAH, TIA, meningitis, encephalitis, pseudotumor cerebri, temporal arteritis, sentinel bleed from ruptured aneurysm, hypertensive urgency or emergency, subdural hematoma, epidural hematoma, ACS, PE, myocarditis, pericarditis, endocarditis, acute pulmonary edema, pleural effusion, pericardial effusion, cardiac tamponade, cardiomyopathy, CHF exacerbation, thoracic aortic dissection, esophageal rupture, other life-threatening arrhythmia, hypertensive urgency or emergency, hemothorax, pulmonary contusion, subcutaneous emphysema, flail chest, pneumo mediastinum, rib fracture, acute surgical abdomen, obstruction, perforation, abscess, mesenteric ischemia, AAA, dissection, cholecystitis, cholangitis, pancreatitis, appendicitis, C. diff colitis, diverticulitis, volvulus, incarcerated hernia, necrotizing fasciitis, TOA, ovarian torsion, PID, ectopic pregnancy, ib[place] syndrome, pyelonephritis, perinephric abscess, kidney stone, urosepsis, fistula, intussusception, or other concerning pathology. Patient will be sent home with vistaril for anxiety. She is advised to follow-up with PCP and her counselor for recheck and may return to ED per discharge instructions. Vitals improved throughout stay here. Otherwise stable for discharge. We have addressed concerns and expectations. Urine shows mild infection, therefore placed on antibiotics. FINAL IMPRESSION      1. Acute nonintractable headache, unspecified headache type    2. Epigastric burning sensation    3. Anxiety state    4. Depression, unspecified depression type    5.  Acute cystitis without hematuria          DISPOSITION/PLAN   DISPOSITION Decision To Discharge 07/02/2020 09:52:46 AM      PATIENT REFERREDTO:  [address] MD  [place] [address]  [phone]      for follow up in 1-3 days    see your counselor in 1-3 days          [facility] Emergency Department  [address] [facility] [address]  [phone]    If symptoms worsen      DISCHARGE MEDICATIONS:  New Prescriptions    CEPHALEXIN (KEFLEX) 500 MG CAPSULE    Take 1 capsule by mouth 2 times daily for 5 days    HYDROXYZINE (VISTARIL) 25 MG CAPSULE    Take 1 capsule by mouth 3 times daily as needed for Anxiety       DISCONTINUED MEDICATIONS:  Discontinued Medications    No medications on file              (Please note that portions of this note were completed with a voice recognition program.  Efforts were made to edit the dictations but occasionally words are mis-transcribed.)    Elizabeth Le PA-C (electronically signed)            Elizabeth Le PA-C  07/02/20 2214

## 2020-07-02 NOTE — ED PROVIDER NOTES
headache, unspecified headache type    2. Epigastric burning sensation    3. Anxiety state    4. Depression, unspecified depression type    5. Acute cystitis without hematuria        Blood pressure 133/77, pulse 63, temperature 98.2 °F (36.8 °C), temperature source Oral, resp. rate 18, height 5' 8\" (1.727 m), weight 190 lb (86.2 kg), last menstrual period 06/15/2020, SpO2 97 %, unknown if currently breastfeeding. For further details of [facility] emergency department encounter, please see documentation by advanced practice provider, Elmira Rodriguez.          Erinn Ramírez MD  07/03/20 2267

## 2020-07-03 LAB — URINE CULTURE, ROUTINE: NORMAL

## 2020-07-06 ENCOUNTER — HOSPITAL ENCOUNTER (EMERGENCY)
Age: 35
Discharge: HOME | End: 2020-07-06
Attending: EMERGENCY MEDICINE
Payer: MEDICAID

## 2020-07-06 VITALS
HEART RATE: 62 BPM | OXYGEN SATURATION: 97 % | DIASTOLIC BLOOD PRESSURE: 69 MMHG | WEIGHT: 200 LBS | BODY MASS INDEX: 30.31 KG/M2 | TEMPERATURE: 97.7 F | RESPIRATION RATE: 14 BRPM | SYSTOLIC BLOOD PRESSURE: 114 MMHG | HEIGHT: 68 IN

## 2020-07-06 LAB
A/G RATIO: 1.3 (ref 1.1–2.2)
ALBUMIN SERPL-MCNC: 3.9 G/DL (ref 3.4–5)
ALP BLD-CCNC: 90 U/L (ref 40–129)
ALT SERPL-CCNC: 59 U/L (ref 10–40)
ANION GAP SERPL CALCULATED.3IONS-SCNC: 15 MMOL/L (ref 3–16)
AST SERPL-CCNC: 39 U/L (ref 15–37)
BASOPHILS ABSOLUTE: 0 K/UL (ref 0–0.2)
BASOPHILS RELATIVE PERCENT: 0.4 %
BILIRUB SERPL-MCNC: 0.3 MG/DL (ref 0–1)
BUN BLDV-MCNC: 12 MG/DL (ref 7–20)
CALCIUM SERPL-MCNC: 9 MG/DL (ref 8.3–10.6)
CHLORIDE BLD-SCNC: 100 MMOL/L (ref 99–110)
CO2: 22 MMOL/L (ref 21–32)
CREAT SERPL-MCNC: 0.7 MG/DL (ref 0.6–1.1)
EOSINOPHILS ABSOLUTE: 0 K/UL (ref 0–0.6)
EOSINOPHILS RELATIVE PERCENT: 0.2 %
GFR AFRICAN AMERICAN: >60
GFR NON-AFRICAN AMERICAN: >60
GLOBULIN: 2.9 G/DL
GLUCOSE BLD-MCNC: 137 MG/DL (ref 70–99)
HCT VFR BLD CALC: 43.2 % (ref 36–48)
HEMOGLOBIN: 14.5 G/DL (ref 12–16)
LIPASE: 14 U/L (ref 13–60)
LYMPHOCYTES ABSOLUTE: 1.2 K/UL (ref 1–5.1)
LYMPHOCYTES RELATIVE PERCENT: 10.2 %
MCH RBC QN AUTO: 29.5 PG (ref 26–34)
MCHC RBC AUTO-ENTMCNC: 33.7 G/DL (ref 31–36)
MCV RBC AUTO: 87.5 FL (ref 80–100)
MONOCYTES ABSOLUTE: 0.3 K/UL (ref 0–1.3)
MONOCYTES RELATIVE PERCENT: 2.9 %
NEUTROPHILS ABSOLUTE: 10.3 K/UL (ref 1.7–7.7)
NEUTROPHILS RELATIVE PERCENT: 86.3 %
PDW BLD-RTO: 15.6 % (ref 12.4–15.4)
PLATELET # BLD: 243 K/UL (ref 135–450)
PMV BLD AUTO: 9.6 FL (ref 5–10.5)
POTASSIUM REFLEX MAGNESIUM: 4.4 MMOL/L (ref 3.5–5.1)
RBC # BLD: 4.93 M/UL (ref 4–5.2)
SODIUM BLD-SCNC: 137 MMOL/L (ref 136–145)
TOTAL PROTEIN: 6.8 G/DL (ref 6.4–8.2)
WBC # BLD: 11.9 K/UL (ref 4–11)

## 2020-07-06 PROCEDURE — 99284 EMERGENCY DEPT VISIT MOD MDM: CPT

## 2020-07-06 PROCEDURE — 85025 COMPLETE CBC W/AUTO DIFF WBC: CPT

## 2020-07-06 PROCEDURE — 6360000002 HC RX W HCPCS: Performed by: EMERGENCY MEDICINE

## 2020-07-06 PROCEDURE — 83690 ASSAY OF LIPASE: CPT

## 2020-07-06 PROCEDURE — 80053 COMPREHEN METABOLIC PANEL: CPT

## 2020-07-06 PROCEDURE — 2580000003 HC RX 258: Performed by: EMERGENCY MEDICINE

## 2020-07-06 PROCEDURE — 96375 TX/PRO/DX INJ NEW DRUG ADDON: CPT

## 2020-07-06 PROCEDURE — 96374 THER/PROPH/DIAG INJ IV PUSH: CPT

## 2020-07-06 RX ORDER — BUTALBITAL, ACETAMINOPHEN AND CAFFEINE 300; 40; 50 MG/1; MG/1; MG/1
1 CAPSULE ORAL EVERY 8 HOURS PRN
Qty: 15 CAPSULE | Refills: 0 | Status: SHIPPED | OUTPATIENT
Start: 2020-07-06

## 2020-07-06 RX ORDER — 0.9 % SODIUM CHLORIDE 0.9 %
1000 INTRAVENOUS SOLUTION INTRAVENOUS ONCE
Status: COMPLETED | OUTPATIENT
Start: 2020-07-06 | End: 2020-07-06

## 2020-07-06 RX ORDER — KETOROLAC TROMETHAMINE 30 MG/ML
30 INJECTION, SOLUTION INTRAMUSCULAR; INTRAVENOUS ONCE
Status: COMPLETED | OUTPATIENT
Start: 2020-07-06 | End: 2020-07-06

## 2020-07-06 RX ORDER — METOCLOPRAMIDE HYDROCHLORIDE 5 MG/ML
10 INJECTION INTRAMUSCULAR; INTRAVENOUS ONCE
Status: COMPLETED | OUTPATIENT
Start: 2020-07-06 | End: 2020-07-06

## 2020-07-06 RX ORDER — ONDANSETRON 4 MG/1
4-8 TABLET, ORALLY DISINTEGRATING ORAL EVERY 8 HOURS PRN
Qty: 12 TABLET | Refills: 0 | Status: SHIPPED | OUTPATIENT
Start: 2020-07-06

## 2020-07-06 RX ORDER — DIPHENHYDRAMINE HYDROCHLORIDE 50 MG/ML
25 INJECTION INTRAMUSCULAR; INTRAVENOUS ONCE
Status: COMPLETED | OUTPATIENT
Start: 2020-07-06 | End: 2020-07-06

## 2020-07-06 RX ADMIN — METOCLOPRAMIDE HYDROCHLORIDE 10 MG: 5 INJECTION INTRAMUSCULAR; INTRAVENOUS at 09:09

## 2020-07-06 RX ADMIN — DIPHENHYDRAMINE HYDROCHLORIDE 25 MG: 50 INJECTION, SOLUTION INTRAMUSCULAR; INTRAVENOUS at 09:09

## 2020-07-06 RX ADMIN — SODIUM CHLORIDE 1000 ML: 9 INJECTION, SOLUTION INTRAVENOUS at 09:08

## 2020-07-06 RX ADMIN — KETOROLAC TROMETHAMINE 30 MG: 30 INJECTION, SOLUTION INTRAMUSCULAR at 09:09

## 2020-07-06 ASSESSMENT — PAIN SCALES - GENERAL
PAINLEVEL_OUTOF10: 10
PAINLEVEL_OUTOF10: 10

## 2020-07-06 ASSESSMENT — PAIN DESCRIPTION - DESCRIPTORS: DESCRIPTORS: HEADACHE

## 2020-07-06 ASSESSMENT — PAIN DESCRIPTION - PAIN TYPE: TYPE: ACUTE PAIN

## 2020-07-06 NOTE — ED NOTES
Pt able to walk to nurses station with no difficulty, states \"am I allowed to just leave? im gonna leave. I've been here a whole hour and no one has been in to see me except for you. I have never been treated so poorly in my life. \" Pt aware care giving nurse still caring for other patients, Dr Alfonzo Haile aware, states needs to see another sick patient and then will be in to see patient. Pt aware, states \"at 0900 im just going to leave. This is ridiculous. \" pt aware other pts in department has been waiting for approx 8 hours. Pt states \"I dont care. im just gonna die right here. \" pt still refusing vitals, alert and oriented x4 still sitting at side of bed, texting on phone. caregiving nurse aware.      Madison Burns RN  07/06/20 6288

## 2020-07-06 NOTE — ED NOTES
Bed: 09  Expected date:   Expected time:   Means of arrival:   Comments:  kelly Bello RN  07/06/20 7309

## 2020-07-07 ENCOUNTER — CARE COORDINATION (OUTPATIENT)
Dept: CARE COORDINATION | Age: 35
End: 2020-07-07

## 2020-07-07 NOTE — ED PROVIDER NOTES
[facility] Emergency Department      Pt Name: Ana Bullock  MRN: [id number]  [place] 1985  Date of evaluation: 2020  Provider: Chris Sylvester MD  CHIEF COMPLAINT  Chief Complaint   Patient presents with    Headache     pt c/o headache/migraines over the last several days. Sts woke up at 1 am and had an episode of emesis. Pt sts she was seen on thursday and they told her \"its just a panic attack and sent me home. \" Hx of migrianes and anxiety. HPI  Ana Bullock is a 36 y.o. female who presents because of headache. Pain started over the last several days. Slow rate of onset and pt rates the pain 10 on a scale of 10. Bright lights and loud noises make the pain worse. History of migraines but more affected by them when younger. A lot of stressors in her life. Multiple episodes of vomiting during the night since 1 am.  Unable to hold down medicine or fluids. Seen in the ED 4 days ago with large work up, brain imaging without emergent findings. States discomfort starts in her upper abdomen and then she gets the headaches. REVIEW OF SYSTEMS:  No fever, no congestion, no chest pain, nausea, Patient's last menstrual period was 06/15/2020. Pertinent positives and negatives as per the HPI. All other review of systems reviewed and negative. Nursing notes reviewed.     PAST MEDICAL HISTORY  Past Medical History:   Diagnosis Date    Disc disorder of cervical region     received steroids in the past    Gestational diabetes     gestational diabetes-on insulin    Heart murmur     High blood pressure     during previous and current pregnancy    Infertility, female 1    conception on Clomid     Migraine     PCOS (polycystic ovarian syndrome)     Postpartum depression     on zoloft started 2019    Pulmonary embolism ([address] 2017    Pulmonary embolism on right [facility])      SURGICAL HISTORY  Past Surgical History:   Procedure Laterality Date     SECTION  2015     SECTION N/A 2020     SECTION performed by Arden Gamboa MD at [facility] L[facility]D OR    CHOLECYSTECTOMY  2016    KNEE SURGERY Right     to treat bone spur    KNEE SURGERY  2011    TONSILLECTOMY      WISDOM TOOTH EXTRACTION       MEDICATIONS:  No current facility-administered medications on file prior to encounter.       Current Outpatient Medications on File Prior to Encounter   Medication Sig Dispense Refill    hydrOXYzine (VISTARIL) 25 MG capsule Take 1 capsule by mouth 3 times daily as needed for Anxiety 20 capsule 0    cephALEXin (KEFLEX) 500 MG capsule Take 1 capsule by mouth 2 times daily for 5 days 10 capsule 0    acetaminophen (APAP EXTRA STRENGTH) 500 MG tablet Take 2 tablets by mouth three times daily 50 tablet 0    ibuprofen (ADVIL;MOTRIN) 800 MG tablet Take 1 tablet by mouth three times daily 60 tablet 0    albuterol sulfate  (90 Base) MCG/ACT inhaler Inhale 2 puffs into the lungs 4 times daily Does not take      ONE TOUCH ULTRA TEST strip Inject 1 box into the skin 4 times daily      sertraline (ZOLOFT) 50 MG tablet Take 100 mg by mouth daily   5    Prenatal Multivit-Min-Fe-FA (PRE- PO) Take by mouth       ALLERGIES  Ciprofloxacin; Azithromycin; Cipro xr; and Sulfa antibiotics  FAMILY HISTORY:    Family History   Problem Relation Age of Onset    High Blood Pressure Mother     High Cholesterol Mother     High Blood Pressure Father     Asthma Father     High Cholesterol Father     Early Death Sister         accidental overdose     Diabetes Maternal Grandmother     Arthritis Maternal Grandmother     Stroke Maternal Grandmother     Birth Defects Neg Hx     Cancer Neg Hx     Depression Neg Hx     Kidney Disease Neg Hx     Heart Disease Neg Hx     Hearing Loss Neg Hx     Learning Disabilities Neg Hx     Mental Illness Neg Hx     Mental Retardation Neg Hx     Miscarriages / Stillbirths Neg Hx     Substance Abuse Neg Hx     Vision Loss Neg Hx SOCIAL HISTORY:  Social History     Tobacco Use    Smoking status: Current Some Day Smoker     Packs/day: 0.50     Types: Cigarettes     Last attempt to quit: 3/24/2015     Years since quittin.2    Smokeless tobacco: Never Used   Substance Use Topics    Alcohol use: Yes     Comment: occ    Drug use: No     IMMUNIZATIONS:  Noncontributory    PHYSICAL EXAM  VITAL SIGNS:  Blood pressure 114/69, pulse 62, temperature 97.7 °F (36.5 °C), resp. rate 14, height 5' 8\" (1.727 m), weight 200 lb (90.7 kg), last menstrual period 06/15/2020, SpO2 97 %, unknown if currently breastfeeding.   Constitutional:  36 y.o. female alert, tearful and anxious  HENT:  Atraumatic, mucous membranes moist, no tenderness at temples, no facial swelling  Eyes:   Conjunctiva clear, no discharge, no icterus  Neck:  Supple, no adenopathy, no visible JVD, no meningeal signs  Cardiovascular:  Regular, no rubs, no discernible murmur  Thorax & Lungs:  No accessory muscle usage, clear  Abdomen:  Soft, non distended, no tenderness  Back:  No deformity  Genitalia:  Deferred  Rectal:  Deferred  Extremities:  No cyanosis, no edema  Skin:  Warm, dry, no rash of trunk or extremities  Neurologic:  Alert & oriented, no slurred speech, CN function intact, PERRL, no nystagmus, coordination normal, no focal deficits, gait is normal  Psychiatric:  Anxious and tearful    DIAGNOSTIC RESULTS:    Labs Reviewed   CBC WITH AUTO DIFFERENTIAL - Abnormal; Notable for the following components:       Result Value    WBC 11.9 (*)     RDW 15.6 (*)     Neutrophils Absolute 10.3 (*)     All other components within normal limits    Narrative:     Performed at:  [facility] [address], [place], [address]   Phone [phone]   COMPREHENSIVE METABOLIC PANEL W/ REFLEX TO MG FOR LOW K - Abnormal; Notable for the following components:    Glucose 137 (*)     ALT 59 (*)     AST 39 (*)     All other components within normal limits Narrative:     Performed at:  [facility]  [address] [facility]  [place], [address] [facility]   Phone [phone]   LIPASE    Narrative:     Performed at:  [facility]  [address] [facility]  [place], [address] [facility]   Phone [phone]     RADIOLOGY:  None    ED COURSE:  Felt better, relaxed  Medications administered:  Medications   0.9 % sodium chloride bolus (0 mLs Intravenous Stopped 7/6/20 1155)   metoclopramide (REGLAN) injection 10 mg (10 mg Intravenous Given 7/6/20 0909)   diphenhydrAMINE (BENADRYL) injection 25 mg (25 mg Intravenous Given 7/6/20 0909)   ketorolac (TORADOL) injection 30 mg (30 mg Intravenous Given 7/6/20 0909)     Vitals:    07/06/20 0715 07/06/20 1138 07/06/20 1142 07/06/20 1144   BP: 131/81 114/69  114/69   Pulse: 60   62   Resp: 18   14   Temp: 97.7 °F (36.5 °C)      SpO2: 100%  97% 97%   Weight: 200 lb (90.7 kg)      Height: 5' 8\" (1.727 m)        PROCEDURES:  None    CRITICAL CARE:  None    CONSULTATIONS:  None    MEDICAL DECISION MAKING: Ezequiel Overton is a 36 y.o. female who presented because of a headache. The patient is feeling improved. She does not have any clinical exam findings that would suggest need for repeat emergent brain imaging or CSF studies. We advised the patient to return to the ER if there is any significant worsening of the pain, vomiting, fever or other new concerns. Ezequiel Overton was given appropriate discharge instructions. Referral to follow up provider.    Differential Diagnosis:  meningitis, encephalitis, ICH, sentinel bleed from ruptured aneurysm, thrombosis, TIA, pseudotumor cerebri, temporal arteritis, hypertensive urgency or emergency, acute angle glaucoma, tumor with mass effect, sinus abscess, mastoiditis, shingles, trigeminal neuralgia, CO poisoning, other    Discharge Medication List as of 7/6/2020 11:45 AM      START taking these medications    Details   butalbital-APAP-caffeine (FIORICET) -40 MG CAPS per capsule Take 1 capsule by mouth every 8 hours as needed for Headaches, Disp-15 capsule, R-0Print      ondansetron (ZOFRAN ODT) 4 MG disintegrating tablet Take 1-2 tablets by mouth every 8 hours as needed for Nausea May substitute the non ODT tablets if not covered financially by the insurance plan., Disp-12 tablet, R-0Print           FOLLOW UP:    [address] Oral[address] Bui MD  [address] [address]  [phone]    Schedule an appointment as soon as possible for a visit       [facility] Emergency Department  [address] [facility]  [address]  Go to   If symptoms worsen    FINAL IMPRESSION:    1. Headache, unspecified headache type      (Please note that I used voice recognition software to generate this note.   Occasionally words are mistranscribed despite my efforts to edit errors.)        Paul Herbert MD  07/07/20 2942

## 2021-10-15 ENCOUNTER — CLINICAL DOCUMENTATION (OUTPATIENT)
Dept: OTHER | Age: 36
End: 2021-10-15

## 2022-07-06 NOTE — FLOWSHEET NOTE
Dr. Julien Healyots aware of pt's tracing and spontaneous decelerations x2.

## 2023-08-01 NOTE — FLOWSHEET NOTE
Pt. Discharged to home undelivered with labor and preeclampsia precautions. Vss. States headache is still present but more mild. Denies any other symptoms. States active fetal movement. Pt. To keep regularly scheduled appointments. Bilobed Transposition Flap Text: Due to geometric and functional constraints, a flap reconstruction was performed to reconstruct the defect. To that end, adjacent tissue was incised and carried over to close the defect in the following manner: The defect edges were debeveled with a #15 scalpel blade.  Given the location of the defect and the proximity to free margins a bilobed transposition flap was deemed most appropriate.  Using a sterile surgical marker, an appropriate bilobe flap drawn around the defect.    The area thus outlined was incised deep to adipose tissue with a #15 scalpel blade.  The skin margins were undermined to an appropriate distance in all directions utilizing iris scissors.

## 2024-10-06 ENCOUNTER — OFFICE VISIT (OUTPATIENT)
Age: 39
End: 2024-10-06

## 2024-10-06 VITALS
BODY MASS INDEX: 27.25 KG/M2 | DIASTOLIC BLOOD PRESSURE: 109 MMHG | HEIGHT: 69 IN | TEMPERATURE: 98.2 F | OXYGEN SATURATION: 98 % | SYSTOLIC BLOOD PRESSURE: 167 MMHG | WEIGHT: 184 LBS | HEART RATE: 93 BPM

## 2024-10-06 DIAGNOSIS — R30.0 DYSURIA: Primary | ICD-10-CM

## 2024-10-06 DIAGNOSIS — N39.0 URINARY TRACT INFECTION WITHOUT HEMATURIA, SITE UNSPECIFIED: ICD-10-CM

## 2024-10-06 DIAGNOSIS — I10 HYPERTENSION, UNSPECIFIED TYPE: ICD-10-CM

## 2024-10-06 LAB
BILIRUBIN, POC: NEGATIVE
BLOOD URINE, POC: ABNORMAL
CLARITY, POC: ABNORMAL
COLOR, POC: ABNORMAL
GLUCOSE URINE, POC: 100 MG/DL
KETONES, POC: ABNORMAL MG/DL
LEUKOCYTE EST, POC: ABNORMAL
NITRITE, POC: POSITIVE
PH, POC: 5
PROTEIN, POC: 300 MG/DL
SPECIFIC GRAVITY, POC: 1.01
UROBILINOGEN, POC: 2 MG/DL

## 2024-10-06 RX ORDER — PHENAZOPYRIDINE HYDROCHLORIDE 200 MG/1
200 TABLET, FILM COATED ORAL 3 TIMES DAILY PRN
Qty: 6 TABLET | Refills: 0 | Status: SHIPPED | OUTPATIENT
Start: 2024-10-06 | End: 2024-10-08

## 2024-10-06 RX ORDER — LISINOPRIL/HYDROCHLOROTHIAZIDE 10-12.5 MG
1 TABLET ORAL DAILY
Qty: 30 TABLET | Refills: 1 | Status: SHIPPED | OUTPATIENT
Start: 2024-10-06

## 2024-10-06 RX ORDER — NITROFURANTOIN 25; 75 MG/1; MG/1
100 CAPSULE ORAL 2 TIMES DAILY
Qty: 14 CAPSULE | Refills: 0 | Status: SHIPPED | OUTPATIENT
Start: 2024-10-06 | End: 2024-10-13

## 2024-10-08 LAB
BACTERIA UR CULT: ABNORMAL
ORGANISM: ABNORMAL

## 2025-06-10 NOTE — ED NOTES
Dr Gonzalez Nurse at bedside.      Jessica Ryan, JANIE  07/06/20 6014 How Severe Is It?: severe Is This A New Presentation, Or A Follow-Up?: Follow Up Isotretinoin Display Cumulative Dose In The Note?: Yes Patient Reported Weight In Pounds (Required For Calculation): 130

## (undated) DEVICE — BLADE CLIPPER GEN PURP NS

## (undated) DEVICE — SUTURE VCRL SZ 2-0 L36IN ABSRB UD L36MM CT-1 1/2 CIR J945H

## (undated) DEVICE — 3M™ STERI-STRIP™ REINFORCED ADHESIVE SKIN CLOSURES, R1549, 1/2 IN X 2 IN (12 MM X 50 MM), 6 STRIPS/ENVELOPE: Brand: 3M™ STERI-STRIP™

## (undated) DEVICE — CHLORAPREP 26ML ORANGE

## (undated) DEVICE — 9165 UNIVERSAL PATIENT PLATE: Brand: 3M™

## (undated) DEVICE — PAD DRESSING TELFA OUCHLESS NONADH 3X8IN

## (undated) DEVICE — STAPLER SKIN SQ 30 ABSRB STPL DISP INSORB

## (undated) DEVICE — TRAY URIN CATH 16FR DRNGE BG STATLOK STBL DEV F SURSTP

## (undated) DEVICE — Z CONVERTED USE 2275207 CLOTH PREP W7.5XL7.5IN 2% CHG SKIN ALC AND RNS FREE

## (undated) DEVICE — GLOVE SURG SZ 65 THK91MIL LTX FREE SYN POLYISOPRENE

## (undated) DEVICE — GARMENT,MEDLINE,DVT,INT,CALF,MED, GEN2: Brand: MEDLINE

## (undated) DEVICE — PACK PROCEDURE SURG C SECT REV 1

## (undated) DEVICE — 3M™ STERI-STRIP™ COMPOUND BENZOIN TINCTURE 40 BAGS/CARTON 4 CARTONS/CASE C1544: Brand: 3M™ STERI-STRIP™

## (undated) DEVICE — SUTURE COAT VCRL SZ 0 L36IN ABSRB VLT CTX L48MM TAPERPOINT J370H

## (undated) DEVICE — WOUND RETRACTOR AND PROTECTOR: Brand: ALEXIS WOUND PROTECTOR-RETRACTOR

## (undated) DEVICE — PENCIL SMK EVAC L10FT TBNG NONSTICK ESU BLDE PLUMEPEN ELITE

## (undated) DEVICE — 50" SINGLE PATIENT USE HOVERMATT: Brand: SINGLE PATIENT USE HOVERMATT

## (undated) DEVICE — BANDAGE COMPR W3INXL5YD COT ZN OXIDE TAN E ADH HVYWT